# Patient Record
Sex: FEMALE | Race: WHITE | NOT HISPANIC OR LATINO | Employment: OTHER | URBAN - METROPOLITAN AREA
[De-identification: names, ages, dates, MRNs, and addresses within clinical notes are randomized per-mention and may not be internally consistent; named-entity substitution may affect disease eponyms.]

---

## 2017-01-03 DIAGNOSIS — T40.2X5A THERAPEUTIC OPIOID INDUCED CONSTIPATION: ICD-10-CM

## 2017-01-03 DIAGNOSIS — K59.03 THERAPEUTIC OPIOID INDUCED CONSTIPATION: ICD-10-CM

## 2017-01-03 RX ORDER — LUBIPROSTONE 8 UG/1
8 CAPSULE ORAL 2 TIMES DAILY WITH MEALS
Qty: 60 CAPSULE | Refills: 2 | Status: SHIPPED | OUTPATIENT
Start: 2017-01-03 | End: 2017-05-09 | Stop reason: SDUPTHER

## 2017-02-14 ENCOUNTER — OFFICE VISIT (OUTPATIENT)
Dept: GYNECOLOGIC ONCOLOGY | Facility: CLINIC | Age: 72
End: 2017-02-14
Payer: MEDICARE

## 2017-02-14 VITALS
HEART RATE: 82 BPM | HEIGHT: 60 IN | BODY MASS INDEX: 39.27 KG/M2 | WEIGHT: 200 LBS | SYSTOLIC BLOOD PRESSURE: 137 MMHG | DIASTOLIC BLOOD PRESSURE: 62 MMHG

## 2017-02-14 DIAGNOSIS — C51.9 VULVAR CANCER: Primary | ICD-10-CM

## 2017-02-14 PROCEDURE — 3078F DIAST BP <80 MM HG: CPT | Mod: S$GLB,,, | Performed by: OBSTETRICS & GYNECOLOGY

## 2017-02-14 PROCEDURE — 99214 OFFICE O/P EST MOD 30 MIN: CPT | Mod: S$GLB,,, | Performed by: OBSTETRICS & GYNECOLOGY

## 2017-02-14 PROCEDURE — 1157F ADVNC CARE PLAN IN RCRD: CPT | Mod: S$GLB,,, | Performed by: OBSTETRICS & GYNECOLOGY

## 2017-02-14 PROCEDURE — 99999 PR PBB SHADOW E&M-EST. PATIENT-LVL IV: CPT | Mod: PBBFAC,,, | Performed by: OBSTETRICS & GYNECOLOGY

## 2017-02-14 PROCEDURE — 3075F SYST BP GE 130 - 139MM HG: CPT | Mod: S$GLB,,, | Performed by: OBSTETRICS & GYNECOLOGY

## 2017-02-14 PROCEDURE — 1159F MED LIST DOCD IN RCRD: CPT | Mod: S$GLB,,, | Performed by: OBSTETRICS & GYNECOLOGY

## 2017-02-14 PROCEDURE — 1160F RVW MEDS BY RX/DR IN RCRD: CPT | Mod: S$GLB,,, | Performed by: OBSTETRICS & GYNECOLOGY

## 2017-02-14 PROCEDURE — 1125F AMNT PAIN NOTED PAIN PRSNT: CPT | Mod: S$GLB,,, | Performed by: OBSTETRICS & GYNECOLOGY

## 2017-02-14 RX ORDER — OXYCODONE HYDROCHLORIDE 5 MG/1
5 CAPSULE ORAL EVERY 6 HOURS PRN
Qty: 30 CAPSULE | Refills: 0 | Status: SHIPPED | OUTPATIENT
Start: 2017-02-14 | End: 2017-05-09 | Stop reason: SDUPTHER

## 2017-02-14 RX ORDER — HYDROCODONE BITARTRATE AND ACETAMINOPHEN 10; 325 MG/1; MG/1
1 TABLET ORAL EVERY 4 HOURS PRN
Qty: 90 TABLET | Refills: 0 | Status: SHIPPED | OUTPATIENT
Start: 2017-02-14 | End: 2017-04-07 | Stop reason: SDUPTHER

## 2017-02-14 NOTE — PROGRESS NOTES
Subjective:       Patient ID: La Summers is a 71 y.o. female.    Chief Complaint: Vulvar Cancer (f/u)    HPI     Patient comes in today for follow up for recurrent vulvar cancer. She has not wanted to start chemotherapy as she knows this would not be curative.      She is taking only 1 NORCO 10/325 in the morning, sometimes BID. Taking motrin 800 mg daily, sometimes BID. She is taking alimitiza to prevent constipation.                 Her oncologic history is:    She has a history of vulvar cancer. This was diagnosed and treated in [place], [place] at the [facility]. She does not know the stage of her disease. She had a radical vulvectomy and bilat. Inguinal femoral lymphadenectomy in 2010.    Feb 2015 recurrence and had left radical excision of recurrent SCCA of the vulva on 2/19/2015. She has undergone a left radical vulvectomy with myocutaneous gracilis muscle flap for reconstruction.    Negative margins.    Sept 2015 had WLE of 2 vulvar lesions both which showed invasive SCCA.    completed radiation to the pelvis, bilateral inguinal region, and vulvar area on December 30, 2015. She received 3600 cGy +2700 cGy boost.       Review of Systems   Respiratory: Positive for shortness of breath (with walking. ).    Psychiatric/Behavioral: The patient is nervous/anxious.         Mild depression.        Objective:       Visit Vitals    /62    Pulse 82    Ht 5' (1.524 m)    Wt 90.7 kg (200 lb)    BMI 39.06 kg/m2       Physical Exam   Constitutional: She appears well-developed and well-nourished.   HENT:   Head: Normocephalic and atraumatic.   Eyes: No scleral icterus.   Cardiovascular: Normal rate and regular rhythm.    Pulmonary/Chest: Effort normal and breath sounds normal.   Lymphadenopathy:        Right: No inguinal adenopathy present.        Left: No inguinal adenopathy present.         Physical Exam   Genitourinary:        Assessment:       1. Vulvar cancer        Plan:   Vulvar cancer     ETHAN    RTC  in 3 months.   We discussed lowering her dose of ibuprofen to 600 mg BID. She has been taking her 's prescription of 800 mg.   I discussed with her and her daughter my concerns for renal compromise from a higher dose.   Will add OXY IR for more immediate relief of pain. Continue with NORCO bid.   Trying to minimize the amount of drowsiness with narcotics.   -     oxycodone (OXY-IR) 5 mg Cap; Take 1 capsule (5 mg total) by mouth every 6 (six) hours as needed for Pain.  Dispense: 30 capsule; Refill: 0  -     hydrocodone-acetaminophen 10-325mg (NORCO)  mg Tab; Take 1 tablet by mouth every 4 (four) hours as needed.  Dispense: 90 tablet; Refill: 0

## 2017-02-14 NOTE — MR AVS SNAPSHOT
[facility] - GYN Oncology  [address]  [facility] [address]  Phone: [phone]                  La Summers   2017 3:15 PM   Office Visit    Description:  Female : 1945   Provider:  Dougie Sanchez MD   Department:  [facility] - GYN Oncology           Reason for Visit     Vulvar Cancer           Diagnoses this Visit        Comments    Vulvar cancer    -  Primary            To Do List           Goals (5 Years of Data)     None      Follow-Up and Disposition     Return in about 3 months (around 2017).       These Medications        Disp Refills Start End    oxycodone (OXY-IR) 5 mg Cap 30 capsule 0 2017     Take 1 capsule (5 mg total) by mouth every 6 (six) hours as needed for Pain. - Oral    Pharmacy: [facility] - [facility] [address] [facility] Ph #: [phone]       hydrocodone-acetaminophen 10-325mg (NORCO)  mg Tab 90 tablet 0 2017     Take 1 tablet by mouth every 4 (four) hours as needed. - Oral    Pharmacy: [facility] - [facility] [address] Ph #: [phone]         [facility] On [facility]     [facility] On Call Nurse Care Line -  Assistance  Registered nurses in the [facility] provide clinical advisement, health education, appointment booking, and other advisory services.  Call for this free service at [phone].             Medications           Message regarding Medications     Verify the changes and/or additions to your medication regime listed below are the same as discussed with your clinician today.  If any of these changes or additions are incorrect, please notify your healthcare provider.        START taking these NEW medications        Refills    oxycodone (OXY-IR) 5 mg Cap 0    Sig: Take 1 capsule (5 mg total) by mouth every 6 (six) hours as needed for Pain.    Class: Normal    Route: Oral           Verify that the below list of medications is an accurate representation  of the medications you are currently taking.  If none reported, the list may be blank. If incorrect, please contact your healthcare provider. Carry this list with you in case of emergency.           Current Medications     aspirin (ECOTRIN) 81 MG EC tablet Take 81 mg by mouth once daily.    docusate sodium (COLACE) 100 MG capsule Take 1 capsule (100 mg total) by mouth 2 (two) times daily.    fish oil-omega-3 fatty acids 300-1,000 mg capsule Take by mouth once daily.      garlic 1 mg Cap Take by mouth once daily.      hydrocodone-acetaminophen 10-325mg (NORCO)  mg Tab Take 1 tablet by mouth every 4 (four) hours as needed.    ibuprofen (ADVIL,MOTRIN) 600 MG tablet Take 1 tablet (600 mg total) by mouth every 6 (six) hours as needed for Pain.    levothyroxine (SYNTHROID) 50 MCG tablet Take by mouth before breakfast.     lidocaine HCL 2% (XYLOCAINE) 2 % jelly Apply to affected area daily prn    lovastatin (MEVACOR) 20 MG tablet Take 20 mg by mouth every evening.    lubiprostone (AMITIZA) 8 MCG Cap Take 1 capsule (8 mcg total) by mouth 2 (two) times daily with meals.    meclizine (ANTIVERT) 32 MG tablet Take 32 mg by mouth as needed.    multivitamin capsule Take 1 capsule by mouth once daily.    nystatin (MYCOSTATIN) cream Apply topically 2 (two) times daily.    nystatin (MYCOSTATIN) powder Apply 1 application topically daily as needed.    polyethylene glycol (GLYCOLAX) 17 gram/dose powder     silver sulfADIAZINE 1% (SILVADENE) 1 % cream Apply topically 2 (two) times daily.    triamcinolone acetonide 0.1% (KENALOG) 0.1 % cream     triamterene-hydrochlorothiazide 37.5-25 mg (DYAZIDE) 37.5-25 mg per capsule Take 1 capsule by mouth every morning.    trimethoprim (TRIMPEX) 100 mg Tab Take 100 mg by mouth every 12 (twelve) hours.    oxycodone (OXY-IR) 5 mg Cap Take 1 capsule (5 mg total) by mouth every 6 (six) hours as needed for Pain.           Clinical Reference Information           Your Vitals Were     BP Pulse  Height Weight BMI    137/62 82 5' (1.524 m) 90.7 kg (200 lb) 39.06 kg/m2      Blood Pressure          Most Recent Value    BP  137/62      Allergies as of 2/14/2017     Latex, Natural Rubber      Immunizations Administered on Date of Encounter - 2/14/2017     None      Language Assistance Services     ATTENTION: Language assistance services are available, free of charge. Please call [phone].      ATENCIÓN: Si habla español, tiene a white disposición servicios gratuitos de asistencia lingüística. Llame al [phone].     CHÚ Ý: N?u b?n nói Ti?ng Vi?t, có các d?ch v? h? tr? ngôn ng? mi?n phí dành cho b?n. G?i s? [phone].         [facility] On[facility] complies with applicable Federal civil rights laws and does not discriminate on the basis of race, color, national origin, age, disability, or sex.

## 2017-04-07 ENCOUNTER — TELEPHONE (OUTPATIENT)
Dept: GYNECOLOGIC ONCOLOGY | Facility: CLINIC | Age: 72
End: 2017-04-07

## 2017-04-07 DIAGNOSIS — C51.9 VULVAR CANCER: ICD-10-CM

## 2017-04-07 RX ORDER — HYDROCODONE BITARTRATE AND ACETAMINOPHEN 10; 325 MG/1; MG/1
1 TABLET ORAL EVERY 4 HOURS PRN
Qty: 90 TABLET | Refills: 0 | Status: SHIPPED | OUTPATIENT
Start: 2017-04-07 | End: 2017-05-18 | Stop reason: SDUPTHER

## 2017-04-07 NOTE — TELEPHONE ENCOUNTER
----- Message from Blake Gabriel sent at 4/7/2017 12:23 PM CDT -----  Contact: self  Pt is calling to get a refill for (Percocet) medication, pt will be using a mail order pharmacy.  Contact number [phone]

## 2017-05-09 ENCOUNTER — OFFICE VISIT (OUTPATIENT)
Dept: GYNECOLOGIC ONCOLOGY | Facility: CLINIC | Age: 72
End: 2017-05-09
Payer: MEDICARE

## 2017-05-09 VITALS
BODY MASS INDEX: 45.21 KG/M2 | DIASTOLIC BLOOD PRESSURE: 57 MMHG | HEART RATE: 85 BPM | SYSTOLIC BLOOD PRESSURE: 120 MMHG | WEIGHT: 231.5 LBS

## 2017-05-09 DIAGNOSIS — K59.03 THERAPEUTIC OPIOID INDUCED CONSTIPATION: ICD-10-CM

## 2017-05-09 DIAGNOSIS — C51.9 VULVAR CANCER: Primary | ICD-10-CM

## 2017-05-09 DIAGNOSIS — T40.2X5A THERAPEUTIC OPIOID INDUCED CONSTIPATION: ICD-10-CM

## 2017-05-09 PROCEDURE — 1125F AMNT PAIN NOTED PAIN PRSNT: CPT | Mod: S$GLB,,, | Performed by: OBSTETRICS & GYNECOLOGY

## 2017-05-09 PROCEDURE — 3074F SYST BP LT 130 MM HG: CPT | Mod: S$GLB,,, | Performed by: OBSTETRICS & GYNECOLOGY

## 2017-05-09 PROCEDURE — 1159F MED LIST DOCD IN RCRD: CPT | Mod: S$GLB,,, | Performed by: OBSTETRICS & GYNECOLOGY

## 2017-05-09 PROCEDURE — 1157F ADVNC CARE PLAN IN RCRD: CPT | Mod: 8P,S$GLB,, | Performed by: OBSTETRICS & GYNECOLOGY

## 2017-05-09 PROCEDURE — 3078F DIAST BP <80 MM HG: CPT | Mod: S$GLB,,, | Performed by: OBSTETRICS & GYNECOLOGY

## 2017-05-09 PROCEDURE — 1160F RVW MEDS BY RX/DR IN RCRD: CPT | Mod: S$GLB,,, | Performed by: OBSTETRICS & GYNECOLOGY

## 2017-05-09 PROCEDURE — 99214 OFFICE O/P EST MOD 30 MIN: CPT | Mod: S$GLB,,, | Performed by: OBSTETRICS & GYNECOLOGY

## 2017-05-09 PROCEDURE — 99999 PR PBB SHADOW E&M-EST. PATIENT-LVL III: CPT | Mod: PBBFAC,,, | Performed by: OBSTETRICS & GYNECOLOGY

## 2017-05-09 RX ORDER — IBUPROFEN 800 MG/1
800 TABLET ORAL EVERY 8 HOURS PRN
COMMUNITY

## 2017-05-09 RX ORDER — LOVASTATIN 40 MG/1
TABLET ORAL
Status: ON HOLD | COMMUNITY
Start: 2017-03-13 | End: 2017-06-30 | Stop reason: HOSPADM

## 2017-05-09 RX ORDER — LUBIPROSTONE 8 UG/1
8 CAPSULE ORAL 2 TIMES DAILY WITH MEALS
Qty: 60 CAPSULE | Refills: 2 | Status: SHIPPED | OUTPATIENT
Start: 2017-05-09

## 2017-05-09 RX ORDER — MORPHINE SULFATE ORAL SOLUTION 10 MG/5ML
10 SOLUTION ORAL EVERY 4 HOURS PRN
Qty: 200 ML | Refills: 0 | Status: SHIPPED | OUTPATIENT
Start: 2017-05-09 | End: 2017-05-12 | Stop reason: SDUPTHER

## 2017-05-09 NOTE — MR AVS SNAPSHOT
[facility] - GYN Oncology  [address]  [facility] [address]  Phone: [phone]                  La Summers   2017 3:00 PM   Office Visit    Description:  Female : 1945   Provider:  Dougie Sanchez MD   Department:  [facility] - GYN Oncology           Reason for Visit     Vulvar Cancer           Diagnoses this Visit        Comments    Vulvar cancer    -  Primary     Therapeutic opioid induced constipation                To Do List           Goals (5 Years of Data)     None      Follow-Up and Disposition     Return in about 3 months (around 2017).       These Medications        Disp Refills Start End    lubiprostone (AMITIZA) 8 MCG Cap 60 capsule 2 2017     Take 1 capsule (8 mcg total) by mouth 2 (two) times daily with meals. - Oral    Pharmacy: [facility] - [facility] [address] [facility] Ph #: [phone]       morphine 10 mg/5 mL solution 200 mL 0 2017     Take 5 mLs (10 mg total) by mouth every 4 (four) hours as needed for Pain. - Oral    [facility]: [facility] [address] [place] [address] [address] [facility] Ph #: [phone]         [facility] On Call     [facility] On Call Nurse Care Line -  Assistance  Unless otherwise directed by your provider, please contact [facility] On-Call, our nurse care line that is available for  assistance.     Registered nurses in the [facility] provide: appointment scheduling, clinical advisement, health education, and other advisory services.  Call: [phone] (toll free)               Medications           Message regarding Medications     Verify the changes and/or additions to your medication regime listed below are the same as discussed with your clinician today.  If any of these changes or additions are incorrect, please notify your healthcare provider.        START taking these NEW medications        Refills    morphine 10 mg/5 mL solution 0     Sig: Take 5 mLs (10 mg total) by mouth every 4 (four) hours as needed for Pain.    Class: Normal    Route: Oral      STOP taking these medications     oxycodone (OXY-IR) 5 mg Cap Take 1 capsule (5 mg total) by mouth every 6 (six) hours as needed for Pain.           Verify that the below list of medications is an accurate representation of the medications you are currently taking.  If none reported, the list may be blank. If incorrect, please contact your healthcare provider. Carry this list with you in case of emergency.           Current Medications     aspirin (ECOTRIN) 81 MG EC tablet Take 81 mg by mouth once daily.    docusate sodium (COLACE) 100 MG capsule Take 1 capsule (100 mg total) by mouth 2 (two) times daily.    fish oil-omega-3 fatty acids 300-1,000 mg capsule Take by mouth once daily.      garlic 1 mg Cap Take by mouth once daily.      ibuprofen (ADVIL,MOTRIN) 800 MG tablet Take 800 mg by mouth every 8 (eight) hours as needed for Pain.    levothyroxine (SYNTHROID) 50 MCG tablet Take by mouth before breakfast.     lidocaine HCL 2% (XYLOCAINE) 2 % jelly Apply to affected area daily prn    lovastatin (MEVACOR) 20 MG tablet Take 20 mg by mouth every evening.    lubiprostone (AMITIZA) 8 MCG Cap Take 1 capsule (8 mcg total) by mouth 2 (two) times daily with meals.    morphine 10 mg/5 mL solution Take 5 mLs (10 mg total) by mouth every 4 (four) hours as needed for Pain.    multivitamin capsule Take 1 capsule by mouth once daily.    nystatin (MYCOSTATIN) cream Apply topically 2 (two) times daily.    nystatin (MYCOSTATIN) powder Apply 1 application topically daily as needed.    polyethylene glycol (GLYCOLAX) 17 gram/dose powder     silver sulfADIAZINE 1% (SILVADENE) 1 % cream Apply topically 2 (two) times daily.    triamcinolone acetonide 0.1% (KENALOG) 0.1 % cream     triamterene-hydrochlorothiazide 37.5-25 mg (DYAZIDE) 37.5-25 mg per capsule Take 1 capsule by mouth every morning.    trimethoprim (TRIMPEX) 100  mg Tab Take 100 mg by mouth every 12 (twelve) hours.    hydrocodone-acetaminophen 10-325mg (NORCO)  mg Tab Take 1 tablet by mouth every 4 (four) hours as needed.    ibuprofen (ADVIL,MOTRIN) 600 MG tablet Take 1 tablet (600 mg total) by mouth every 6 (six) hours as needed for Pain.    lovastatin (MEVACOR) 40 MG tablet     meclizine (ANTIVERT) 32 MG tablet Take 32 mg by mouth as needed.           Clinical Reference Information           Your Vitals Were     BP Pulse Weight BMI       120/57 85 105 kg (231 lb 7.7 oz) 45.21 kg/m2       Blood Pressure          Most Recent Value    BP  (!)  120/57      Allergies as of 5/9/2017     Latex, Natural Rubber      Immunizations Administered on Date of Encounter - 5/9/2017     None      Language Assistance Services     ATTENTION: Language assistance services are available, free of charge. Please call [phone].      ATENCIÓN: Si habla hernandez, tiene a white disposición servicios gratuitos de asistencia lingüística. Llame al [phone].     KEYUR Ý: N?u b?n nói Ti?ng Vi?t, có các d?ch v? h? tr? ngôn ng? mi?n phí dành cho b?n. G?i s? [phone].         [facility] On[facility] complies with applicable Federal civil rights laws and does not discriminate on the basis of race, color, national origin, age, disability, or sex.

## 2017-05-09 NOTE — PROGRESS NOTES
Subjective:       Patient ID: La Summers is a 72 y.o. female.    Chief Complaint: Vulvar Cancer (3mth)    HPI  Review of Systems   Constitutional: Negative for chills, fatigue and fever.   Respiratory: Negative for cough, shortness of breath and wheezing.         Cpap     Cardiovascular: Negative for chest pain, palpitations and leg swelling.   Gastrointestinal: Negative for abdominal pain, constipation, diarrhea, nausea and vomiting.   Genitourinary: Negative for difficulty urinating, dysuria, frequency, genital sores, hematuria, urgency, vaginal bleeding, vaginal discharge and vaginal pain.        Mild vulva bleeding.   Occurs after BM.        Neurological: Negative for weakness.   Hematological: Negative for adenopathy. Does not bruise/bleed easily.   Psychiatric/Behavioral: The patient is not nervous/anxious.        Objective:     BP (!) 120/57  Pulse 85  Wt 105 kg (231 lb 7.7 oz)  BMI 45.21 kg/m2    Physical Exam   Constitutional: She is oriented to person, place, and time. She appears well-developed and well-nourished.   HENT:   Head: Normocephalic and atraumatic.   Eyes: No scleral icterus.   Abdominal: Soft. She exhibits no distension and no mass. There is no tenderness. There is no rebound and no guarding. No hernia.   Genitourinary:         Genitourinary Comments: Vulva: coalescing of all lesions on the vulva. Increase in friable areas. Unable to insert finger into the vagina.        Neurological: She is alert and oriented to person, place, and time.   Skin: Skin is warm and dry.   Psychiatric: She has a normal mood and affect. Her behavior is normal. Judgment and thought content normal.       Assessment:       1. Vulvar cancer    2. Therapeutic opioid induced constipation        Plan:   Vulvar cancer  Clinically patient has progressive disease.   We discussed pain management.   I also discussed with her and her daughter that we should begin to think about role for palliative care especially in  regards to pain management.   She did not fill oxyIR. Will resend as IR morphine for insurance.     RTC in 3 months. Sooner if needed.    -     morphine 10 mg/5 mL solution; Take 5 mLs (10 mg total) by mouth every 4 (four) hours as needed for Pain.  Dispense: 200 mL; Refill: 0    Therapeutic opioid induced constipation  -     lubiprostone (AMITIZA) 8 MCG Cap; Take 1 capsule (8 mcg total) by mouth 2 (two) times daily with meals.  Dispense: 60 capsule; Refill: 2

## 2017-05-12 ENCOUNTER — TELEPHONE (OUTPATIENT)
Dept: GYNECOLOGIC ONCOLOGY | Facility: CLINIC | Age: 72
End: 2017-05-12

## 2017-05-12 DIAGNOSIS — C51.9 VULVAR CANCER: ICD-10-CM

## 2017-05-12 RX ORDER — MORPHINE SULFATE ORAL SOLUTION 10 MG/5ML
10 SOLUTION ORAL EVERY 4 HOURS PRN
Qty: 200 ML | Refills: 0 | Status: ON HOLD | OUTPATIENT
Start: 2017-05-12 | End: 2017-07-01 | Stop reason: HOSPADM

## 2017-05-12 NOTE — TELEPHONE ENCOUNTER
Spoke with pharmacist. He states he was informed by Parul who has left for the day a new script for morphine 20 ml will sent to pharmacy. He states script has not been received. Informed pharmacist will speak with physician, contact pharmacy when script has been sent.

## 2017-05-12 NOTE — TELEPHONE ENCOUNTER
Spoke with pt. Pt states a new script for morphine 20 ml needs to be sent to [facility] pharmacy. Medication does not come in original dosage. Pt advised I will speak with pharmacy and Dr. Sanchez. Pt voiced understanding

## 2017-05-12 NOTE — TELEPHONE ENCOUNTER
----- Message from Blake Gabriel sent at 5/12/2017  1:59 PM CDT -----  Contact: Parul with [facility]   Parul with [facility] is requesting another script for pt (Morphine), also pharmacy states this medication (strength) is not available only 20 mg /ml in stock, if so a new prescription would need to be faxed to number listed.  Contact number [phone]  Fax number [phone]

## 2017-05-12 NOTE — TELEPHONE ENCOUNTER
----- Message from Blake Gabriel sent at 5/12/2017  1:59 PM CDT -----  Contact: aPrul with [facility]   Parul with [facility] is requesting another script for pt (Morphine), also pharmacy states this medication (strength) is not available only 20 mg /ml in stock, if so a new prescription would need to be faxed to number listed.  Contact number [phone]  Fax number [phone]

## 2017-05-12 NOTE — PROGRESS NOTES
Patient's daughter informed that I sent morphine rx to [facility] pharmacy as her local [facility] did not have the 10mg/5 ml.

## 2017-05-18 DIAGNOSIS — C51.9 VULVAR CANCER: ICD-10-CM

## 2017-05-18 RX ORDER — HYDROCODONE BITARTRATE AND ACETAMINOPHEN 10; 325 MG/1; MG/1
1 TABLET ORAL EVERY 4 HOURS PRN
Qty: 90 TABLET | Refills: 0 | Status: SHIPPED | OUTPATIENT
Start: 2017-05-18 | End: 2017-06-20 | Stop reason: SDUPTHER

## 2017-06-20 DIAGNOSIS — C51.9 VULVAR CANCER: ICD-10-CM

## 2017-06-21 ENCOUNTER — TELEPHONE (OUTPATIENT)
Dept: GYNECOLOGIC ONCOLOGY | Facility: CLINIC | Age: 72
End: 2017-06-21

## 2017-06-21 RX ORDER — HYDROCODONE BITARTRATE AND ACETAMINOPHEN 10; 325 MG/1; MG/1
1 TABLET ORAL EVERY 4 HOURS PRN
Qty: 90 TABLET | Refills: 0 | Status: SHIPPED | OUTPATIENT
Start: 2017-06-21

## 2017-06-23 ENCOUNTER — TELEPHONE (OUTPATIENT)
Dept: GYNECOLOGIC ONCOLOGY | Facility: HOSPITAL | Age: 72
End: 2017-06-23

## 2017-06-23 ENCOUNTER — TELEPHONE (OUTPATIENT)
Dept: GYNECOLOGIC ONCOLOGY | Facility: CLINIC | Age: 72
End: 2017-06-23

## 2017-06-23 DIAGNOSIS — N30.00 ACUTE CYSTITIS WITHOUT HEMATURIA: ICD-10-CM

## 2017-06-23 RX ORDER — CIPROFLOXACIN 500 MG/1
500 TABLET ORAL 2 TIMES DAILY
Qty: 20 TABLET | Refills: 0 | Status: ON HOLD | OUTPATIENT
Start: 2017-06-23 | End: 2017-06-30 | Stop reason: HOSPADM

## 2017-06-23 NOTE — TELEPHONE ENCOUNTER
Spoke with pt daughter Renée. She states pt had a fever of 101.7 last night. Pt took percocet with tylenol and motrin alternating medications by 3 hours per renée, with ice packs to help break fever, pt urine is dark, cloudy and orange. Renée took pt temperature this morning pt is 97.9. Renée advised I will speak with Dr. Sanchez. She voiced understanding and states if pt needs to come in be seen she will but pt does not want to because traveling is painful.

## 2017-06-23 NOTE — TELEPHONE ENCOUNTER
Returned patient call.  Mrs. Summers is a patient of Dr. Sanchez's who has extensive vulvar cancer.   Pt states she has a fever of 101.7F. ROS negative other than darkened urine. She has been taking her home percocet throughout the day. Last dose was at 1630, and this temp was at 0030. I advised her to go to the emergency room where her fever could be properly worked up and a urine sample could be collected. Pt was very hesitant to do so as traveling is very painful for her. Pt advised that if she cannot come to the ED, she needs to call Dr. Sanchez's office first thing in the morning and potentially drop a urine sample off to be tested. She stated her understanding. About an hour later, the patient's daughter called stating that her mother's temperature was now 100.2 after taking another Percocet and that she is concerned she needs to be evaluated. I reiterated to her that the pt was advised to seek care at the ED, but that she opted to call the office in the AM. Daughter stated her understanding. I reviewed precautions and stated that if she does convince her mother to go to the ED, she can ask them to page me at any time and I would be happy to see her.    Will review this with Dr. Sanchez later this AM.

## 2017-06-23 NOTE — TELEPHONE ENCOUNTER
----- Message from Isabell Dao sent at 6/23/2017  8:15 AM CDT -----  La Summers has fever since last night/please contact her daughter (Alexandria) at [phone]     Pt was advised by on-call physician last night to go to ER & she did not want to go per Alexandria     Clinic# [id number]

## 2017-06-23 NOTE — TELEPHONE ENCOUNTER
Spoke with renée. She is aware per Dr. Sanchez, pt urine will be treated for a UTI. Dr. Sanchez will send medication to her pharmacy. If fever persists she will need to go to the emergency room. Renée voiced understanding

## 2017-06-24 ENCOUNTER — HOSPITAL ENCOUNTER (INPATIENT)
Facility: HOSPITAL | Age: 72
LOS: 6 days | Discharge: HOSPICE | DRG: 871 | End: 2017-07-01
Attending: EMERGENCY MEDICINE | Admitting: HOSPITALIST
Payer: MEDICARE

## 2017-06-24 DIAGNOSIS — C51.9 VULVAR CANCER: Primary | ICD-10-CM

## 2017-06-24 DIAGNOSIS — R50.9 FEVER, UNSPECIFIED FEVER CAUSE: ICD-10-CM

## 2017-06-24 DIAGNOSIS — G47.33 OBSTRUCTIVE SLEEP APNEA ON CPAP: Chronic | ICD-10-CM

## 2017-06-24 DIAGNOSIS — R07.9 CHEST PAIN: ICD-10-CM

## 2017-06-24 DIAGNOSIS — R01.1 CARDIAC MURMUR: ICD-10-CM

## 2017-06-24 DIAGNOSIS — A41.9 SEPSIS SECONDARY TO UTI: ICD-10-CM

## 2017-06-24 DIAGNOSIS — N39.0 SEPSIS SECONDARY TO UTI: ICD-10-CM

## 2017-06-24 DIAGNOSIS — N39.0 URINARY TRACT INFECTION WITHOUT HEMATURIA, SITE UNSPECIFIED: ICD-10-CM

## 2017-06-24 DIAGNOSIS — J45.901 REACTIVE AIRWAY DISEASE, UNSPECIFIED ASTHMA SEVERITY, WITH ACUTE EXACERBATION: ICD-10-CM

## 2017-06-24 DIAGNOSIS — R06.02 SHORTNESS OF BREATH: ICD-10-CM

## 2017-06-24 LAB
ALBUMIN SERPL BCP-MCNC: 2 G/DL
ALP SERPL-CCNC: 137 U/L
ALT SERPL W/O P-5'-P-CCNC: 14 U/L
ANION GAP SERPL CALC-SCNC: 13 MMOL/L
ANISOCYTOSIS BLD QL SMEAR: SLIGHT
AST SERPL-CCNC: 20 U/L
BASOPHILS # BLD AUTO: 0.01 K/UL
BASOPHILS NFR BLD: 0 %
BILIRUB SERPL-MCNC: 0.8 MG/DL
BNP SERPL-MCNC: 298 PG/ML
BUN SERPL-MCNC: 19 MG/DL
CALCIUM SERPL-MCNC: 8.7 MG/DL
CHLORIDE SERPL-SCNC: 95 MMOL/L
CO2 SERPL-SCNC: 25 MMOL/L
CREAT SERPL-MCNC: 1.2 MG/DL
DIFFERENTIAL METHOD: ABNORMAL
EOSINOPHIL # BLD AUTO: 0.1 K/UL
EOSINOPHIL NFR BLD: 0.4 %
ERYTHROCYTE [DISTWIDTH] IN BLOOD BY AUTOMATED COUNT: 14.9 %
EST. GFR  (AFRICAN AMERICAN): 53 ML/MIN/1.73 M^2
EST. GFR  (NON AFRICAN AMERICAN): 46 ML/MIN/1.73 M^2
GLUCOSE SERPL-MCNC: 148 MG/DL
HCT VFR BLD AUTO: 26.8 %
HGB BLD-MCNC: 8.4 G/DL
INR PPP: 1.3
LYMPHOCYTES # BLD AUTO: 0.4 K/UL
LYMPHOCYTES NFR BLD: 1.9 %
MAGNESIUM SERPL-MCNC: 1 MG/DL
MCH RBC QN AUTO: 28.2 PG
MCHC RBC AUTO-ENTMCNC: 31.3 %
MCV RBC AUTO: 90 FL
MONOCYTES # BLD AUTO: 1.4 K/UL
MONOCYTES NFR BLD: 6 %
NEUTROPHILS # BLD AUTO: 20.9 K/UL
NEUTROPHILS NFR BLD: 91.7 %
PLATELET # BLD AUTO: 241 K/UL
PLATELET BLD QL SMEAR: ABNORMAL
PMV BLD AUTO: 9.2 FL
POTASSIUM SERPL-SCNC: 3.1 MMOL/L
PROT SERPL-MCNC: 7.7 G/DL
PROTHROMBIN TIME: 13.3 SEC
RBC # BLD AUTO: 2.98 M/UL
SODIUM SERPL-SCNC: 133 MMOL/L
WBC # BLD AUTO: 22.84 K/UL

## 2017-06-24 PROCEDURE — 99285 EMERGENCY DEPT VISIT HI MDM: CPT | Mod: 25

## 2017-06-24 PROCEDURE — 93005 ELECTROCARDIOGRAM TRACING: CPT

## 2017-06-24 PROCEDURE — 83735 ASSAY OF MAGNESIUM: CPT

## 2017-06-24 PROCEDURE — 96361 HYDRATE IV INFUSION ADD-ON: CPT

## 2017-06-24 PROCEDURE — 84484 ASSAY OF TROPONIN QUANT: CPT

## 2017-06-24 PROCEDURE — 85610 PROTHROMBIN TIME: CPT

## 2017-06-24 PROCEDURE — 85025 COMPLETE CBC W/AUTO DIFF WBC: CPT

## 2017-06-24 PROCEDURE — 96365 THER/PROPH/DIAG IV INF INIT: CPT

## 2017-06-24 PROCEDURE — 83880 ASSAY OF NATRIURETIC PEPTIDE: CPT

## 2017-06-24 PROCEDURE — 96375 TX/PRO/DX INJ NEW DRUG ADDON: CPT

## 2017-06-24 PROCEDURE — 80053 COMPREHEN METABOLIC PANEL: CPT

## 2017-06-24 PROCEDURE — 93010 ELECTROCARDIOGRAM REPORT: CPT | Mod: S$GLB,,, | Performed by: INTERNAL MEDICINE

## 2017-06-24 RX ADMIN — IOHEXOL 100 ML: 350 INJECTION, SOLUTION INTRAVENOUS at 11:06

## 2017-06-25 PROBLEM — A41.9 SEPSIS SECONDARY TO UTI: Status: ACTIVE | Noted: 2017-06-25

## 2017-06-25 PROBLEM — G89.3 CHRONIC NEOPLASM-RELATED PAIN: Chronic | Status: ACTIVE | Noted: 2017-06-25

## 2017-06-25 PROBLEM — N17.9 ACUTE KIDNEY INJURY: Status: ACTIVE | Noted: 2017-06-25

## 2017-06-25 PROBLEM — R01.1 HOLOSYSTOLIC MURMUR: Chronic | Status: ACTIVE | Noted: 2017-06-25

## 2017-06-25 PROBLEM — E87.6 HYPOKALEMIA: Status: ACTIVE | Noted: 2017-06-25

## 2017-06-25 PROBLEM — N39.0 SEPSIS SECONDARY TO UTI: Status: ACTIVE | Noted: 2017-06-25

## 2017-06-25 PROBLEM — E83.42 HYPOMAGNESEMIA: Status: ACTIVE | Noted: 2017-06-25

## 2017-06-25 LAB
BACTERIA #/AREA URNS HPF: ABNORMAL /HPF
BILIRUB UR QL STRIP: NEGATIVE
CLARITY UR: CLEAR
COLOR UR: YELLOW
GLUCOSE UR QL STRIP: NEGATIVE
GRAM STN SPEC: NORMAL
GRAM STN SPEC: NORMAL
HGB UR QL STRIP: ABNORMAL
KETONES UR QL STRIP: NEGATIVE
LEUKOCYTE ESTERASE UR QL STRIP: ABNORMAL
MICROSCOPIC COMMENT: ABNORMAL
NITRITE UR QL STRIP: NEGATIVE
PH UR STRIP: 6 [PH] (ref 5–8)
PROT UR QL STRIP: ABNORMAL
RBC #/AREA URNS HPF: 12 /HPF (ref 0–4)
SP GR UR STRIP: 1.01 (ref 1–1.03)
SQUAMOUS #/AREA URNS HPF: 15 /HPF
TROPONIN I SERPL DL<=0.01 NG/ML-MCNC: 0.02 NG/ML
URN SPEC COLLECT METH UR: ABNORMAL
UROBILINOGEN UR STRIP-ACNC: 1 EU/DL
WBC #/AREA URNS HPF: >100 /HPF (ref 0–5)
WBC CLUMPS URNS QL MICRO: ABNORMAL

## 2017-06-25 PROCEDURE — 27000190 HC CPAP FULL FACE MASK W/VALVE

## 2017-06-25 PROCEDURE — 25000003 PHARM REV CODE 250: Performed by: EMERGENCY MEDICINE

## 2017-06-25 PROCEDURE — 94761 N-INVAS EAR/PLS OXIMETRY MLT: CPT

## 2017-06-25 PROCEDURE — 11000001 HC ACUTE MED/SURG PRIVATE ROOM

## 2017-06-25 PROCEDURE — 25000003 PHARM REV CODE 250: Performed by: HOSPITALIST

## 2017-06-25 PROCEDURE — 63600175 PHARM REV CODE 636 W HCPCS: Performed by: HOSPITALIST

## 2017-06-25 PROCEDURE — 25500020 PHARM REV CODE 255: Performed by: EMERGENCY MEDICINE

## 2017-06-25 PROCEDURE — 87205 SMEAR GRAM STAIN: CPT

## 2017-06-25 PROCEDURE — 99900035 HC TECH TIME PER 15 MIN (STAT)

## 2017-06-25 PROCEDURE — 63600175 PHARM REV CODE 636 W HCPCS: Performed by: EMERGENCY MEDICINE

## 2017-06-25 PROCEDURE — 81000 URINALYSIS NONAUTO W/SCOPE: CPT

## 2017-06-25 PROCEDURE — 94660 CPAP INITIATION&MGMT: CPT

## 2017-06-25 PROCEDURE — 87086 URINE CULTURE/COLONY COUNT: CPT

## 2017-06-25 RX ORDER — SODIUM CHLORIDE 9 MG/ML
1000 INJECTION, SOLUTION INTRAVENOUS
Status: COMPLETED | OUTPATIENT
Start: 2017-06-25 | End: 2017-06-25

## 2017-06-25 RX ORDER — POLYETHYLENE GLYCOL 3350 17 G/17G
17 POWDER, FOR SOLUTION ORAL 2 TIMES DAILY
Status: DISCONTINUED | OUTPATIENT
Start: 2017-06-25 | End: 2017-07-01 | Stop reason: HOSPADM

## 2017-06-25 RX ORDER — LEVOTHYROXINE SODIUM 50 UG/1
50 TABLET ORAL
Status: DISCONTINUED | OUTPATIENT
Start: 2017-06-25 | End: 2017-07-01 | Stop reason: HOSPADM

## 2017-06-25 RX ORDER — MORPHINE SULFATE 10 MG/ML
4 INJECTION INTRAMUSCULAR; INTRAVENOUS; SUBCUTANEOUS
Status: COMPLETED | OUTPATIENT
Start: 2017-06-25 | End: 2017-06-25

## 2017-06-25 RX ORDER — ACETAMINOPHEN 325 MG/1
650 TABLET ORAL EVERY 6 HOURS PRN
Status: DISCONTINUED | OUTPATIENT
Start: 2017-06-25 | End: 2017-07-01 | Stop reason: HOSPADM

## 2017-06-25 RX ORDER — DOCUSATE SODIUM 100 MG/1
100 CAPSULE, LIQUID FILLED ORAL 2 TIMES DAILY
Status: DISCONTINUED | OUTPATIENT
Start: 2017-06-25 | End: 2017-07-01 | Stop reason: HOSPADM

## 2017-06-25 RX ORDER — ONDANSETRON 2 MG/ML
4 INJECTION INTRAMUSCULAR; INTRAVENOUS
Status: COMPLETED | OUTPATIENT
Start: 2017-06-25 | End: 2017-06-25

## 2017-06-25 RX ORDER — LUBIPROSTONE 8 UG/1
8 CAPSULE ORAL 2 TIMES DAILY WITH MEALS
Status: DISCONTINUED | OUTPATIENT
Start: 2017-06-25 | End: 2017-07-01 | Stop reason: HOSPADM

## 2017-06-25 RX ORDER — MECLIZINE HYDROCHLORIDE 25 MG/1
25 TABLET ORAL 3 TIMES DAILY PRN
Status: DISCONTINUED | OUTPATIENT
Start: 2017-06-25 | End: 2017-07-01 | Stop reason: HOSPADM

## 2017-06-25 RX ORDER — BUTALBITAL, ACETAMINOPHEN AND CAFFEINE 50; 325; 40 MG/1; MG/1; MG/1
1 TABLET ORAL EVERY 4 HOURS PRN
Status: DISCONTINUED | OUTPATIENT
Start: 2017-06-25 | End: 2017-07-01 | Stop reason: HOSPADM

## 2017-06-25 RX ORDER — HYDROCODONE BITARTRATE AND ACETAMINOPHEN 10; 325 MG/1; MG/1
1 TABLET ORAL EVERY 4 HOURS PRN
Status: DISCONTINUED | OUTPATIENT
Start: 2017-06-25 | End: 2017-06-26

## 2017-06-25 RX ORDER — POTASSIUM CHLORIDE 20 MEQ/1
40 TABLET, EXTENDED RELEASE ORAL ONCE
Status: COMPLETED | OUTPATIENT
Start: 2017-06-25 | End: 2017-06-25

## 2017-06-25 RX ORDER — MAGNESIUM SULFATE HEPTAHYDRATE 40 MG/ML
2 INJECTION, SOLUTION INTRAVENOUS EVERY 4 HOURS
Status: COMPLETED | OUTPATIENT
Start: 2017-06-25 | End: 2017-06-25

## 2017-06-25 RX ORDER — CEFEPIME HYDROCHLORIDE 1 G/50ML
1 INJECTION, SOLUTION INTRAVENOUS
Status: DISCONTINUED | OUTPATIENT
Start: 2017-06-25 | End: 2017-06-29

## 2017-06-25 RX ORDER — SODIUM CHLORIDE 9 MG/ML
INJECTION, SOLUTION INTRAVENOUS CONTINUOUS
Status: DISCONTINUED | OUTPATIENT
Start: 2017-06-25 | End: 2017-06-26

## 2017-06-25 RX ORDER — MORPHINE SULFATE ORAL SOLUTION 10 MG/5ML
10 SOLUTION ORAL EVERY 4 HOURS PRN
Status: DISCONTINUED | OUTPATIENT
Start: 2017-06-25 | End: 2017-06-26

## 2017-06-25 RX ORDER — BUTALBITAL, ACETAMINOPHEN AND CAFFEINE 50; 325; 40 MG/1; MG/1; MG/1
1 TABLET ORAL ONCE
Status: COMPLETED | OUTPATIENT
Start: 2017-06-25 | End: 2017-06-25

## 2017-06-25 RX ORDER — SODIUM CHLORIDE 9 MG/ML
INJECTION, SOLUTION INTRAVENOUS CONTINUOUS
Status: DISCONTINUED | OUTPATIENT
Start: 2017-06-25 | End: 2017-06-25

## 2017-06-25 RX ADMIN — SODIUM CHLORIDE: 0.9 INJECTION, SOLUTION INTRAVENOUS at 04:06

## 2017-06-25 RX ADMIN — HYDROCODONE BITARTRATE AND ACETAMINOPHEN 1 TABLET: 10; 325 TABLET ORAL at 09:06

## 2017-06-25 RX ADMIN — MAGNESIUM SULFATE HEPTAHYDRATE 2 G: 40 INJECTION, SOLUTION INTRAVENOUS at 11:06

## 2017-06-25 RX ADMIN — DOCUSATE SODIUM 100 MG: 100 CAPSULE, LIQUID FILLED ORAL at 08:06

## 2017-06-25 RX ADMIN — LUBIPROSTONE 8 MCG: 8 CAPSULE, GELATIN COATED ORAL at 08:06

## 2017-06-25 RX ADMIN — MAGNESIUM SULFATE HEPTAHYDRATE 2 G: 40 INJECTION, SOLUTION INTRAVENOUS at 05:06

## 2017-06-25 RX ADMIN — LEVOTHYROXINE SODIUM 50 MCG: 50 TABLET ORAL at 05:06

## 2017-06-25 RX ADMIN — SODIUM CHLORIDE 1000 ML: 0.9 INJECTION, SOLUTION INTRAVENOUS at 01:06

## 2017-06-25 RX ADMIN — SODIUM CHLORIDE: 0.9 INJECTION, SOLUTION INTRAVENOUS at 09:06

## 2017-06-25 RX ADMIN — HYDROCODONE BITARTRATE AND ACETAMINOPHEN 1 TABLET: 10; 325 TABLET ORAL at 08:06

## 2017-06-25 RX ADMIN — LUBIPROSTONE 8 MCG: 8 CAPSULE, GELATIN COATED ORAL at 04:06

## 2017-06-25 RX ADMIN — CEFEPIME HYDROCHLORIDE 1 G: 1 INJECTION, SOLUTION INTRAVENOUS at 04:06

## 2017-06-25 RX ADMIN — POLYETHYLENE GLYCOL 3350 17 G: 17 POWDER, FOR SOLUTION ORAL at 08:06

## 2017-06-25 RX ADMIN — HYDROCODONE BITARTRATE AND ACETAMINOPHEN 1 TABLET: 10; 325 TABLET ORAL at 04:06

## 2017-06-25 RX ADMIN — SODIUM CHLORIDE 500 ML: 0.9 INJECTION, SOLUTION INTRAVENOUS at 10:06

## 2017-06-25 RX ADMIN — ONDANSETRON 4 MG: 2 INJECTION INTRAMUSCULAR; INTRAVENOUS at 12:06

## 2017-06-25 RX ADMIN — VANCOMYCIN HYDROCHLORIDE 1500 MG: 1 INJECTION, POWDER, LYOPHILIZED, FOR SOLUTION INTRAVENOUS at 06:06

## 2017-06-25 RX ADMIN — CEFTRIAXONE 1 G: 1 INJECTION, SOLUTION INTRAVENOUS at 02:06

## 2017-06-25 RX ADMIN — POTASSIUM CHLORIDE 40 MEQ: 1500 TABLET, EXTENDED RELEASE ORAL at 03:06

## 2017-06-25 RX ADMIN — BUTALBITAL, ACETAMINOPHEN, AND CAFFEINE 1 TABLET: 50; 325; 40 TABLET ORAL at 11:06

## 2017-06-25 RX ADMIN — MORPHINE SULFATE 4 MG: 10 INJECTION INTRAVENOUS at 12:06

## 2017-06-25 NOTE — HPI
La Summers is a 72 y.o. 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  She lives in [place], [place] with her  and son.  Her daughter Alexandria is a nurse.  Her primary care physician is Dr. Mary Ann Mccray.  Her gynecologic oncologist is Dr. Dougie Sanchez.      Dr. Sanchez prescribed her ciprofloxacin for a UTI on 6/23/17.  She had fever of 101.7 F at the time.  She went to [facility] ED on 6/24/17 with shortness of breath that began in the afternoon, with mucous congestion.  She had poor appetite and felt too weak to stand.  Due to mechanical obstruction by her cancer, she must stand to urinate.  Dr. Sanchez had referred to her to urologist Dr. Savage Quinonez to address this worsening problem.  She had bilateral leg edema.  She was not hypoxic.  She was tachypnic (respiratory rate 22) and put on nasal cannula.  Blood pressure was 109/56.  Labs showed leukocytosis (WBC 22,840), anemia (hemoglobin 8.4, hematocrit 26.8), hypokalemia (3.1), acute kidney injury (creatinine 1.2 from 0.8 on 9/13/16), hypoalbuminemia (2.0), elevated BNP (298).  Urinalysis had >100 WBC/hpf, moderate WBC clumps, and moderate bacteria.  Chest CTA showed no pulmonary embolism, but showed nonspecific dilation of the left pulmonary artery, and mosaic pattern of the pulmonary parenchyma suggesting reactive airway disease or chronic thromboembolic disease.  She denied history of ever having asthma, COPD, or pulmonary embolism.  She was given IV  morphine, ondansetron, and normal saline, and admitted to [facility].  She was also found to have severe hypomagnesemia (1.0).

## 2017-06-25 NOTE — PROGRESS NOTES
Vancomycin Pharmacokinetics Monitoring Protocol  71 y/o female, Ht 60 in, Wt 106.4kg, adjBW 69.9kg, SCr 1.2, eCrCl ~ 48 ml/min  Indication: sepsis 2/2 UTI  Target trough: 15 -20 mcg/ml  Current antibiotics: Cefepime 1gram IV q12h, vancomycin 1250mg IV q12h  Based on her pharmacokinetics, plan to give vancomycin 1500mg IV (AdjBW = 69.9 kg) x1, then start vanc 1250mg IV q24 with trough drawn prior to the 4th dose (6/28 @ 1700). Pharmacy will continue to follow.

## 2017-06-25 NOTE — SUBJECTIVE & OBJECTIVE
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       bilateral. Sept 2015.        Review of patient's allergies indicates:   Allergen Reactions    Adhesive     Latex, natural rubber      Skin irritation       No current facility-administered medications on file prior to encounter.      Current Outpatient Prescriptions on File Prior to Encounter   Medication Sig    aspirin (ECOTRIN) 81 MG EC tablet Take 81 mg by mouth once daily.    ciprofloxacin HCl (CIPRO) 500 MG tablet Take 1 tablet (500 mg total) by mouth 2 (two) times daily.    docusate sodium (COLACE) 100 MG capsule Take 1 capsule (100 mg total) by mouth 2 (two) times daily.    fish oil-omega-3 fatty acids 300-1,000 mg capsule Take by mouth once daily.      garlic 1 mg Cap Take by mouth once daily.      hydrocodone-acetaminophen 10-325mg (NORCO)  mg Tab Take 1 tablet by mouth every 4 (four) hours as needed.    ibuprofen (ADVIL,MOTRIN) 600 MG tablet Take 1 tablet (600 mg total) by mouth every 6 (six) hours as needed for Pain.    ibuprofen (ADVIL,MOTRIN) 800 MG tablet Take 800 mg by mouth every 8 (eight) hours as needed for Pain.    levothyroxine (SYNTHROID) 50 MCG tablet Take 50 mcg by mouth before breakfast.     lidocaine HCL 2% (XYLOCAINE) 2 % jelly Apply to affected area daily prn    lovastatin (MEVACOR) 20 MG tablet Take 20 mg by mouth every evening.    lovastatin (MEVACOR) 40 MG tablet     lubiprostone (AMITIZA) 8 MCG Cap Take 1 capsule (8 mcg total) by mouth 2 (two) times  daily with meals.    meclizine (ANTIVERT) 32 MG tablet Take 32 mg by mouth as needed.    morphine 10 mg/5 mL solution Take 5 mLs (10 mg total) by mouth every 4 (four) hours as needed for Pain.    multivitamin capsule Take 1 capsule by mouth once daily.    nystatin (MYCOSTATIN) cream Apply topically 2 (two) times daily.    nystatin (MYCOSTATIN) powder Apply 1 application topically daily as needed.    polyethylene glycol (GLYCOLAX) 17 gram/dose powder     silver sulfADIAZINE 1% (SILVADENE) 1 % cream Apply topically 2 (two) times daily.    triamcinolone acetonide 0.1% (KENALOG) 0.1 % cream     triamterene-hydrochlorothiazide 37.5-25 mg (DYAZIDE) 37.5-25 mg per capsule Take 1 capsule by mouth every morning.     Family History     Problem Relation (Age of Onset)    Cancer Daughter        Social History Main Topics    Smoking status: Never Smoker    Smokeless tobacco: Not on file    Alcohol use No    Drug use: No    Sexual activity: No     Review of Systems   Constitutional: Positive for appetite change and fatigue.   HENT: Positive for congestion. Negative for trouble swallowing.    Eyes: Negative for pain and redness.   Respiratory: Positive for shortness of breath. Negative for chest tightness.    Cardiovascular: Positive for leg swelling. Negative for chest pain.   Gastrointestinal: Positive for constipation. Negative for blood in stool.   Genitourinary: Positive for difficulty urinating. Negative for hematuria.   Musculoskeletal: Negative for neck pain and neck stiffness.   Skin: Negative for rash and wound.   Neurological: Negative for syncope and speech difficulty.     Objective:     Vital Signs (Most Recent):  Temp: 97.4 °F (36.3 °C) (06/25/17 0040)  Pulse: 95 (06/25/17 0146)  Resp: 16 (06/25/17 0146)  BP: (!) 123/57 (06/25/17 0146)  SpO2: 100 % (06/25/17 0146) Vital Signs (24h Range):  Temp:  [97.4 °F (36.3 °C)] 97.4 °F (36.3 °C)  Pulse:  [] 95  Resp:  [16-22] 16  SpO2:  [95 %-100 %] 100  %  BP: (109-128)/(56-62) 123/57     Weight: 104.8 kg (231 lb)  Body mass index is 45.11 kg/m².    Physical Exam   Constitutional: She is oriented to person, place, and time. She appears well-developed. No distress.   HENT:   Head: Normocephalic and atraumatic.   Eyes: Conjunctivae are normal. Pupils are equal, round, and reactive to light.   Neck: Neck supple. No tracheal deviation present.   Cardiovascular: Normal rate and regular rhythm.    Murmur heard.   Systolic murmur is present   Pulmonary/Chest: Effort normal and breath sounds normal. No respiratory distress.   Abdominal: Soft. She exhibits no distension. There is no tenderness.   Musculoskeletal: She exhibits edema. She exhibits no deformity.   Neurological: She is alert and oriented to person, place, and time.   Skin: Skin is warm and dry.   Psychiatric: She has a normal mood and affect.   Nursing note and vitals reviewed.       Significant Labs: All pertinent labs within the past 24 hours have been reviewed.    Significant Imaging: I have reviewed all pertinent imaging results/findings within the past 24 hours.   CTA Chest Non-Coronary (PE Study) 6/25/17: CT pulmonary embolism examination:  There are no filling defects within the pulmonary artery suggesting pulmonary embolism.  There is nonspecific dilation of the left main pulmonary artery.  CT chest examination:  The thyroid gland is unremarkable.  The visualized portions of the base of the neck neck is within normal limits.  The trachea and central airways are within normal limits.  There is no evidence of bronchiectasis.  The heart is normal in appearance.  Coronary artery calcifications are present.  There are no pericardial effusions.  The thoracic aorta is normal in caliber.  Scattered calcifications of the thoracic aorta.  The great vessels arising from aortic arch are within normal limits.  There is no evidence of lymphadenopathy in the chest.  The axillary regions are within normal  limits.  There are no pleural effusions.  There is no evidence of a pneumothorax.  Evaluation for lung nodules is difficult given patient motion.  There is mosaic attenuation of the pulmonary parenchyma.  No focal consolidations are identified.  The esophagus is within normal limits.  The visualized upper abdominal structures are unremarkable.  There is diffuse osteopenia making evaluation difficult.  No definitive fracture is identified.  No blastic lesion is present.  Chest wall is unremarkable.  Impression:   1.  No evidence of pulmonary embolism.  2.  Mosaic attenuation of the pulmonary parenchyma.  The findings may be seen with reactive airway disease or chronic thromboembolic disease.  Suggest clinical correlation.  3.  Additional findings as above.

## 2017-06-25 NOTE — PLAN OF CARE
Chief Complaint   Patient presents with    Shortness of Breath       sob x 1 day, post nasal drip and congstion x 2 day, generally feeling ill with low grade temp. started on cipro a few days ago for uti but was never evaluated.     No current HH, has Quad Cane/C-PAP, prior to admit was independent, family will provide transportation home    Yanet Funez RN Transitional Navigator  [phone]

## 2017-06-25 NOTE — PLAN OF CARE
Chief Complaint   Patient presents with    Shortness of Breath       sob x 1 day, post nasal drip and congstion x 2 day, generally feeling ill with low grade temp. started on cipro a few days ago for uti but was never evaluated.     No current HH, has Quad Cane/C-PAP, prior to admit was independent, family will provide transportation home       06/25/17 1726   Discharge Assessment   Assessment Type Discharge Planning Assessment   Confirmed/corrected address and phone number on facesheet? Yes   Assessment information obtained from? Patient   Expected Length of Stay (days) 2   Communicated expected length of stay with patient/caregiver yes   Prior to hospitilization cognitive status: Alert/Oriented   Prior to hospitalization functional status: Independent   Current cognitive status: Alert/Oriented   Current Functional Status: Needs Assistance   Arrived From home or self-care   Lives With spouse   Able to Return to Prior Arrangements yes   Is patient able to care for self after discharge? Yes   How many people do you have in your home that can help with your care after discharge? 1   Who are your caregiver(s) and their phone number(s)? daughter:  Carine Duvallcomo  [phone], son:  Dariel Duvallcomo [phone]   Patient's perception of discharge disposition home or selfcare   Readmission Within The Last 30 Days no previous admission in last 30 days   Patient currently being followed by outpatient case management? No   Patient currently receives home health services? No   Does the patient currently use HME? Yes   Patient currently receives private duty nursing? No   Equipment Currently Used at Home cane, quad;CPAP   Do you have any problems affording any of your prescribed medications? No   Is the patient taking medications as prescribed? yes   Do you have any financial concerns preventing you from receiving the healthcare you need? No   Does the patient have transportation to healthcare appointments? Yes    Transportation Available family or friend will provide   On Dialysis? No   Does the patient receive services at the [facility] Clinic? No   Are there any open cases? No   Discharge Plan A Home   Discharge Plan B Home with family   Patient/Family In Agreement With Plan yes       Yanet Funez RN Transitional Navigator  [phone]

## 2017-06-25 NOTE — H&P
[facility]  History & Physical    Patient Name: La Summers  MRN: [id number]  Admission Date: 6/24/2017  Attending Physician: Jones Rocha MD   Primary Care Provider: Suzette Mccray MD         Patient information was obtained from patient, past medical records and ER records.     Subjective:     Principal Problem:Sepsis secondary to UTI    Chief Complaint:   Chief Complaint   Patient presents with    Shortness of Breath     sob x 1 day, post nasal drip and congstion x 2 day, generally feeling ill with low grade temp. started on cipro a few days ago for uti but was never evaluated.          HPI: La Summers is a 72 y.o. 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  She lives in [place], [place] with her  and son.  Her daughter Alexandria is a nurse.  Her primary care physician is Dr. Mary Ann Mccray.  Her gynecologic oncologist is Dr. Dougie Sanchez.      Dr. Sanchez prescribed her ciprofloxacin for a UTI on 6/23/17.  She had fever of 101.7 F at the time.  She went to [facility] ED on 6/24/17 with shortness of breath that began in the afternoon, with mucous congestion.  She had poor appetite and felt too weak to stand.  Due to mechanical obstruction by her cancer, she must stand to urinate.  Dr. Sanchez had referred to her to urologist Dr. Savage Quinonez to address this worsening problem.  She had bilateral leg edema.  She was not hypoxic.  She was tachypnic (respiratory rate 22) and put on nasal  cannula.  Blood pressure was 109/56.  Labs showed leukocytosis (WBC 22,840), anemia (hemoglobin 8.4, hematocrit 26.8), hypokalemia (3.1), acute kidney injury (creatinine 1.2 from 0.8 on 9/13/16), hypoalbuminemia (2.0), elevated BNP (298).  Urinalysis had >100 WBC/hpf, moderate WBC clumps, and moderate bacteria.  Chest CTA showed no pulmonary embolism, but showed nonspecific dilation of the left pulmonary artery, and mosaic pattern of the pulmonary parenchyma suggesting reactive airway disease or chronic thromboembolic disease.  She denied history of ever having pulmonary embolism.  She was given IV morphine, ondansetron, and normal saline, and admitted to [facility].  She was also found to have severe hypomagnesemia (1.0).    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       bilateral. Sept 2015.        Review of patient's allergies indicates:   Allergen Reactions    Adhesive     Latex, natural rubber      Skin irritation       No current facility-administered medications on file prior to encounter.      Current Outpatient Prescriptions on File Prior to Encounter   Medication Sig    aspirin (ECOTRIN) 81 MG EC tablet Take 81 mg by mouth once daily.    ciprofloxacin HCl (CIPRO) 500 MG tablet Take 1 tablet (500 mg total) by mouth 2 (two) times daily.    docusate sodium (COLACE) 100 MG capsule Take 1 capsule (100 mg total) by mouth 2 (two) times daily.    fish oil-omega-3 fatty acids 300-1,000 mg capsule Take by mouth once daily.       garlic 1 mg Cap Take by mouth once daily.      hydrocodone-acetaminophen 10-325mg (NORCO)  mg Tab Take 1 tablet by mouth every 4 (four) hours as needed.    ibuprofen (ADVIL,MOTRIN) 600 MG tablet Take 1 tablet (600 mg total) by mouth every 6 (six) hours as needed for Pain.    ibuprofen (ADVIL,MOTRIN) 800 MG tablet Take 800 mg by mouth every 8 (eight) hours as needed for Pain.    levothyroxine (SYNTHROID) 50 MCG tablet Take 50 mcg by mouth before breakfast.     lidocaine HCL 2% (XYLOCAINE) 2 % jelly Apply to affected area daily prn    lovastatin (MEVACOR) 20 MG tablet Take 20 mg by mouth every evening.    lovastatin (MEVACOR) 40 MG tablet     lubiprostone (AMITIZA) 8 MCG Cap Take 1 capsule (8 mcg total) by mouth 2 (two) times daily with meals.    meclizine (ANTIVERT) 32 MG tablet Take 32 mg by mouth as needed.    morphine 10 mg/5 mL solution Take 5 mLs (10 mg total) by mouth every 4 (four) hours as needed for Pain.    multivitamin capsule Take 1 capsule by mouth once daily.    nystatin (MYCOSTATIN) cream Apply topically 2 (two) times daily.    nystatin (MYCOSTATIN) powder Apply 1 application topically daily as needed.    polyethylene glycol (GLYCOLAX) 17 gram/dose powder     silver sulfADIAZINE 1% (SILVADENE) 1 % cream Apply topically 2 (two) times daily.    triamcinolone acetonide 0.1% (KENALOG) 0.1 % cream     triamterene-hydrochlorothiazide 37.5-25 mg (DYAZIDE) 37.5-25 mg per capsule Take 1 capsule by mouth every morning.     Family History     Problem Relation (Age of Onset)    Cancer Daughter        Social History Main Topics    Smoking status: Never Smoker    Smokeless tobacco: Not on file    Alcohol use No    Drug use: No    Sexual activity: No     Review of Systems   Constitutional: Positive for appetite change and fatigue.   HENT: Positive for congestion. Negative for trouble swallowing.    Eyes: Negative for pain and redness.   Respiratory: Positive for shortness of breath.  Negative for chest tightness.    Cardiovascular: Positive for leg swelling. Negative for chest pain.   Gastrointestinal: Positive for constipation. Negative for blood in stool.   Genitourinary: Positive for difficulty urinating. Negative for hematuria.   Musculoskeletal: Negative for neck pain and neck stiffness.   Skin: Negative for rash and wound.   Neurological: Negative for syncope and speech difficulty.     Objective:     Vital Signs (Most Recent):  Temp: 97.4 °F (36.3 °C) (06/25/17 0040)  Pulse: 95 (06/25/17 0146)  Resp: 16 (06/25/17 0146)  BP: (!) 123/57 (06/25/17 0146)  SpO2: 100 % (06/25/17 0146) Vital Signs (24h Range):  Temp:  [97.4 °F (36.3 °C)] 97.4 °F (36.3 °C)  Pulse:  [] 95  Resp:  [16-22] 16  SpO2:  [95 %-100 %] 100 %  BP: (109-128)/(56-62) 123/57     Weight: 104.8 kg (231 lb)  Body mass index is 45.11 kg/m².    Physical Exam   Constitutional: She is oriented to person, place, and time. She appears well-developed. No distress.   HENT:   Head: Normocephalic and atraumatic.   Eyes: Conjunctivae are normal. Pupils are equal, round, and reactive to light.   Neck: Neck supple. No tracheal deviation present.   Cardiovascular: Normal rate and regular rhythm.    Murmur heard.   Systolic murmur is present   Pulmonary/Chest: Effort normal and breath sounds normal. No respiratory distress.   Abdominal: Soft. She exhibits no distension. There is no tenderness.   Musculoskeletal: She exhibits edema. She exhibits no deformity.   Neurological: She is alert and oriented to person, place, and time.   Skin: Skin is warm and dry.   Psychiatric: She has a normal mood and affect.   Nursing note and vitals reviewed.       Significant Labs: All pertinent labs within the past 24 hours have been reviewed.    Significant Imaging: I have reviewed all pertinent imaging results/findings within the past 24 hours.   CTA Chest Non-Coronary (PE Study) 6/25/17: CT pulmonary embolism examination:  There are no filling defects  within the pulmonary artery suggesting pulmonary embolism.  There is nonspecific dilation of the left main pulmonary artery.  CT chest examination:  The thyroid gland is unremarkable.  The visualized portions of the base of the neck neck is within normal limits.  The trachea and central airways are within normal limits.  There is no evidence of bronchiectasis.  The heart is normal in appearance.  Coronary artery calcifications are present.  There are no pericardial effusions.  The thoracic aorta is normal in caliber.  Scattered calcifications of the thoracic aorta.  The great vessels arising from aortic arch are within normal limits.  There is no evidence of lymphadenopathy in the chest.  The axillary regions are within normal limits.  There are no pleural effusions.  There is no evidence of a pneumothorax.  Evaluation for lung nodules is difficult given patient motion.  There is mosaic attenuation of the pulmonary parenchyma.  No focal consolidations are identified.  The esophagus is within normal limits.  The visualized upper abdominal structures are unremarkable.  There is diffuse osteopenia making evaluation difficult.  No definitive fracture is identified.  No blastic lesion is present.  Chest wall is unremarkable.  Impression:   1.  No evidence of pulmonary embolism.  2.  Mosaic attenuation of the pulmonary parenchyma.  The findings may be seen with reactive airway disease or chronic thromboembolic disease.  Suggest clinical correlation.  3.  Additional findings as above.    Assessment/Plan:     * Sepsis secondary to UTI    Acute cystitis without hematuria  Give cefepime and IV saline.  Hold triameterene-hydrochlorothiazide.  Will need assistance to stand to urinate.  Follow up urine culture.  Consult Urology if necessary but already planning to see Urology at [facility] outpatient.          Hypomagnesemia    Give magnesium sulfate.          Holosystolic murmur    Echocardiogram to evaluate if related  to shortness of breath and peripheral edema.          Acute kidney injury    Give IV saline.          Hypokalemia    Give potassium chloride.          Chronic neoplasm-related pain    Continue hydrocodone-acetaminophen  mg q4h PRN.          Therapeutic opioid induced constipation    Takes lubiprostone 8 mcg BID and polyethylene glycol.  Continue.          Essential hypertension    Hold triamterene-hydrochlorothiazide 37.5-25 mg daily.          Acquired hypothyroidism    Continue levothyroxine 50 mcg daily.          Vulvar cancer    Not currently treated.  Followed by Dr. Dougie Sanchez.            VTE Risk Mitigation         Ordered     Medium Risk of VTE  Once      06/25/17 0348     Place MIRIAM hose  Until discontinued      06/25/17 0348     Place sequential compression device  Until discontinued      06/25/17 0348        Parviz Mccarthy MD  Department of Hospital Medicine   [facility]

## 2017-06-25 NOTE — ASSESSMENT & PLAN NOTE
Acute cystitis without hematuria  Give cefepime and IV saline.  Hold triameterene-hydrochlorothiazide.  Will need assistance to stand to urinate.  Follow up urine culture.  Consult Urology if necessary but already planning to see Urology at [facility] outpatient.

## 2017-06-25 NOTE — ED PROVIDER NOTES
Encounter Date: 6/24/2017       History     Chief Complaint   Patient presents with    Shortness of Breath     sob x 1 day, post nasal drip and congstion x 2 day, generally feeling ill with low grade temp. started on cipro a few days ago for uti but was never evaluated.       The history is provided by the patient.   Shortness of Breath   This is a new problem. The current episode started 1 to 2 hours ago. The problem has not changed since onset.Associated symptoms include a fever and leg swelling. Pertinent negatives include no cough, no chest pain and no vomiting. Associated symptoms comments: She had fever of 101.7 two days ago, which was attributed to a UTI.. Associated medical issues comments: Vulvar cancer.     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       bilateral. Sept 2015.      Family History   Problem Relation Age of Onset    Cancer Daughter      cervical cancer    Ovarian cancer Neg Hx     Uterine cancer Neg Hx     Breast cancer Neg Hx     Colon cancer Neg Hx      Social History   Substance Use Topics    Smoking status: Never Smoker    Smokeless tobacco: Not on file    Alcohol use No     Review of Systems   Constitutional: Positive for fever.   Respiratory: Positive for shortness of breath. Negative for cough.    Cardiovascular: Positive for leg swelling. Negative for chest pain.   Gastrointestinal:  Negative for diarrhea and vomiting.   Neurological: Positive for weakness. Negative for syncope.       Physical Exam     Initial Vitals [06/24/17 2100]   BP Pulse Resp Temp SpO2   128/62 103 (!) 22 -- 95 %      MAP       84         Physical Exam    Nursing note and vitals reviewed.  Constitutional: She appears distressed.   HENT:   Head: Normocephalic and atraumatic.   Eyes: EOM are normal.   Neck: Normal range of motion. Neck supple.   Cardiovascular: Normal rate, regular rhythm and normal heart sounds.   Pulmonary/Chest: Breath sounds normal.   Abdominal: Soft.   Musculoskeletal: Normal range of motion. She exhibits edema.   Neurological: She is alert and oriented to person, place, and time.   Skin: Skin is warm and dry.   Psychiatric: Her behavior is normal. Thought content normal.         ED Course   Procedures  Labs Reviewed   CBC W/ AUTO DIFFERENTIAL - Abnormal; Notable for the following:        Result Value    WBC 22.84 (*)     RBC 2.98 (*)     Hemoglobin 8.4 (*)     Hematocrit 26.8 (*)     MCHC 31.3 (*)     RDW 14.9 (*)     Gran # 20.9 (*)     Lymph # 0.4 (*)     Mono # 1.4 (*)     Gran% 91.7 (*)     Lymph% 1.9 (*)     All other components within normal limits   COMPREHENSIVE METABOLIC PANEL - Abnormal; Notable for the following:     Sodium 133 (*)     Potassium 3.1 (*)     Glucose 148 (*)     Albumin 2.0 (*)     Alkaline Phosphatase 137 (*)     eGFR if  53 (*)     eGFR if non  46 (*)     All other components within normal limits   PROTIME-INR - Abnormal; Notable for the following:     Prothrombin Time 13.3 (*)     INR 1.3 (*)     All other components within normal limits   B-TYPE NATRIURETIC PEPTIDE - Abnormal; Notable for the following:      (*)     All other components within normal limits   MAGNESIUM - Abnormal; Notable for the following:     Magnesium 1.0 (*)     All other components within normal limits   URINALYSIS - Abnormal; Notable for the following:      Protein, UA Trace (*)     Occult Blood UA 1+ (*)     Leukocytes, UA 3+ (*)     All other components within normal limits   URINALYSIS MICROSCOPIC - Abnormal; Notable for the following:     RBC, UA 12 (*)     WBC, UA >100 (*)     WBC Clumps, UA Moderate (*)     Bacteria, UA Moderate (*)     All other components within normal limits   TROPONIN I     EKG Readings: (Independently Interpreted)   Rhythm: Normal Sinus Rhythm. Heart Rate: 95. Conduction: RBBB. ST Segments: Normal ST Segments.     Imaging Results          CTA Chest Non-Coronary (PE Study) (Final result)  Result time 06/25/17 00:09:06    Final result by Papa Blancas MD (06/25/17 00:09:06)                 Impression:       1.  No evidence of pulmonary embolism.    2.  Mosaic attenuation of the pulmonary parenchyma.  The findings may be seen with reactive airway disease or chronic thromboembolic disease.  Suggest clinical correlation.    3.  Additional findings as above.              Electronically signed by: PAPA BLANCAS MD  Date:     06/25/17  Time:    00:09              Narrative:    Exam: [id number]  06/24/17  23:54:13 [id number] ([facility]) : CTA CHEST NON CORONARY    Technique:     CT images of the chest obtained during pulmonary arterial phase after injection of 100 cc Omnipaque IV contrast. Coronal and Sagittal Reformats were obtained.     Comparison:    7/3/12    Findings:      CT pulmonary embolism examination:  There are no filling defects within the pulmonary artery suggesting pulmonary embolism.  There is nonspecific dilation of the left main pulmonary artery.    CT chest examination:  The thyroid gland is unremarkable.  The visualized portions of the base of the neck neck is within normal limits.    The trachea and central airways are within normal limits.  There is no evidence of bronchiectasis.    The heart is normal in appearance.  Coronary artery calcifications are present.  There are no pericardial effusions.    The thoracic aorta is normal in caliber.   Scattered calcifications of the thoracic aorta.  The great vessels arising from aortic arch are within normal limits.    There is no evidence of lymphadenopathy in the chest.  The axillary regions are within normal limits.    There are no pleural effusions.  There is no evidence of a pneumothorax.  Evaluation for lung nodules is difficult given patient motion.  There is mosaic attenuation of the pulmonary parenchyma.  No focal consolidations are identified.    The esophagus is within normal limits.  The visualized upper abdominal structures are unremarkable.    There is diffuse osteopenia making evaluation difficult.  No definitive fracture is identified.  No blastic lesion is present.  Chest wall is unremarkable.                             X-Ray Chest 1 View (Final result)  Result time 06/24/17 21:43:20    Final result by Garrett Lee MD (06/24/17 21:43:20)                 Impression:     No acute abnormality.      Electronically signed by: GARRETT LEE MD  Date:     06/24/17  Time:    21:43              Narrative:    Chest, one view    Comparison: September 13, 2016    Findings: Lungs are clear. No effusion or pneumothorax. No displaced fracture. No change.                                                   ED Course     Clinical Impression:   The primary encounter diagnosis was Vulvar cancer. Diagnoses of Shortness of breath, Urinary tract infection without hematuria, site unspecified, Fever, unspecified fever cause, and Sepsis secondary to UTI were also pertinent to this visit.                           Jarret Meier MD  06/25/17 [id number]

## 2017-06-25 NOTE — ED NOTES
Patient has verified the spelling of their name and  on armband  LOC: The patient is awake, alert, and aware of environment with an appropriate affect, the patient is oriented x 4 and speaking appropriately, pt daughter states that pt takes percocet and daily due to cancer.   APPEARANCE:  patient is clean and well groomed, patient's clothing is properly fastened.   SKIN: The skin is warm and dry, color consistent with ethnicity  : denies any problems urinating   MUSCULOSKELETAL: Patient moving all extremities spontaneously, no obvious swelling or deformities noted. Pain to pelvic area due to cancer per pt daughter at bedside.   RESPIRATORY: Airway is open and patent, respirations are spontaneous, patient has a normal effort and rate, no accessory muscle use noted, bilateral breath sounds clear, complaints of  SOB, pt family states she was wheezing at home, no wheezing noted at present time   ABDOMEN: Soft and non tender to palpation, no distention noted, normoactive bowel sounds present in all four quadrants.   CARDIAC: heart murmur, denies chest pain

## 2017-06-26 ENCOUNTER — TELEPHONE (OUTPATIENT)
Dept: GYNECOLOGIC ONCOLOGY | Facility: CLINIC | Age: 72
End: 2017-06-26

## 2017-06-26 PROBLEM — E83.42 HYPOMAGNESEMIA: Status: RESOLVED | Noted: 2017-06-25 | Resolved: 2017-06-26

## 2017-06-26 PROBLEM — I35.0 AORTIC STENOSIS: Chronic | Status: ACTIVE | Noted: 2017-06-25

## 2017-06-26 PROBLEM — E87.6 HYPOKALEMIA: Status: RESOLVED | Noted: 2017-06-25 | Resolved: 2017-06-26

## 2017-06-26 LAB
ANION GAP SERPL CALC-SCNC: 6 MMOL/L
ANISOCYTOSIS BLD QL SMEAR: SLIGHT
AORTIC VALVE STENOSIS: ABNORMAL
BACTERIA UR CULT: NORMAL
BASOPHILS # BLD AUTO: ABNORMAL K/UL
BASOPHILS NFR BLD: 0 %
BUN SERPL-MCNC: 19 MG/DL
CALCIUM SERPL-MCNC: 8 MG/DL
CHLORIDE SERPL-SCNC: 97 MMOL/L
CO2 SERPL-SCNC: 24 MMOL/L
CREAT SERPL-MCNC: 1 MG/DL
DIASTOLIC DYSFUNCTION: YES
DIFFERENTIAL METHOD: ABNORMAL
EOSINOPHIL # BLD AUTO: ABNORMAL K/UL
EOSINOPHIL NFR BLD: 0 %
ERYTHROCYTE [DISTWIDTH] IN BLOOD BY AUTOMATED COUNT: 15 %
EST. GFR  (AFRICAN AMERICAN): >60 ML/MIN/1.73 M^2
EST. GFR  (NON AFRICAN AMERICAN): 57 ML/MIN/1.73 M^2
ESTIMATED PA SYSTOLIC PRESSURE: 45.25
GLUCOSE SERPL-MCNC: 139 MG/DL
HCT VFR BLD AUTO: 24.1 %
HGB BLD-MCNC: 7.8 G/DL
HYPOCHROMIA BLD QL SMEAR: ABNORMAL
LYMPHOCYTES # BLD AUTO: ABNORMAL K/UL
LYMPHOCYTES NFR BLD: 3 %
MAGNESIUM SERPL-MCNC: 2.3 MG/DL
MCH RBC QN AUTO: 27.8 PG
MCHC RBC AUTO-ENTMCNC: 32.4 %
MCV RBC AUTO: 86 FL
MITRAL VALVE MOBILITY: ABNORMAL
MONOCYTES # BLD AUTO: ABNORMAL K/UL
MONOCYTES NFR BLD: 2 %
NEUTROPHILS # BLD AUTO: ABNORMAL K/UL
NEUTROPHILS NFR BLD: 87 %
NEUTS BAND NFR BLD MANUAL: 8 %
OVALOCYTES BLD QL SMEAR: ABNORMAL
PLATELET # BLD AUTO: 237 K/UL
PLATELET BLD QL SMEAR: ABNORMAL
PMV BLD AUTO: 9 FL
POLYCHROMASIA BLD QL SMEAR: ABNORMAL
POTASSIUM SERPL-SCNC: 3.6 MMOL/L
RBC # BLD AUTO: 2.81 M/UL
RETIRED EF AND QEF - SEE NOTES: 60 (ref 55–65)
SODIUM SERPL-SCNC: 127 MMOL/L
TRICUSPID VALVE REGURGITATION: ABNORMAL
WBC # BLD AUTO: 25.9 K/UL

## 2017-06-26 PROCEDURE — 11000001 HC ACUTE MED/SURG PRIVATE ROOM

## 2017-06-26 PROCEDURE — 36415 COLL VENOUS BLD VENIPUNCTURE: CPT

## 2017-06-26 PROCEDURE — 63600175 PHARM REV CODE 636 W HCPCS: Performed by: HOSPITALIST

## 2017-06-26 PROCEDURE — 80048 BASIC METABOLIC PNL TOTAL CA: CPT

## 2017-06-26 PROCEDURE — 25000003 PHARM REV CODE 250: Performed by: HOSPITALIST

## 2017-06-26 PROCEDURE — 93306 TTE W/DOPPLER COMPLETE: CPT

## 2017-06-26 PROCEDURE — 85007 BL SMEAR W/DIFF WBC COUNT: CPT

## 2017-06-26 PROCEDURE — 85027 COMPLETE CBC AUTOMATED: CPT

## 2017-06-26 PROCEDURE — 83735 ASSAY OF MAGNESIUM: CPT

## 2017-06-26 PROCEDURE — 94761 N-INVAS EAR/PLS OXIMETRY MLT: CPT

## 2017-06-26 PROCEDURE — 93306 TTE W/DOPPLER COMPLETE: CPT | Mod: 26,,, | Performed by: INTERNAL MEDICINE

## 2017-06-26 RX ORDER — OXYCODONE HYDROCHLORIDE 5 MG/1
10 TABLET ORAL EVERY 4 HOURS PRN
Status: DISCONTINUED | OUTPATIENT
Start: 2017-06-26 | End: 2017-07-01 | Stop reason: HOSPADM

## 2017-06-26 RX ORDER — IBUPROFEN 400 MG/1
800 TABLET ORAL
Status: DISCONTINUED | OUTPATIENT
Start: 2017-06-26 | End: 2017-07-01 | Stop reason: HOSPADM

## 2017-06-26 RX ORDER — OXYCODONE HYDROCHLORIDE 5 MG/1
15 TABLET ORAL EVERY 4 HOURS PRN
Status: DISCONTINUED | OUTPATIENT
Start: 2017-06-26 | End: 2017-07-01 | Stop reason: HOSPADM

## 2017-06-26 RX ORDER — FLUTICASONE PROPIONATE 50 MCG
2 SPRAY, SUSPENSION (ML) NASAL DAILY
Status: DISCONTINUED | OUTPATIENT
Start: 2017-06-26 | End: 2017-07-01 | Stop reason: HOSPADM

## 2017-06-26 RX ADMIN — LUBIPROSTONE 8 MCG: 8 CAPSULE, GELATIN COATED ORAL at 06:06

## 2017-06-26 RX ADMIN — VANCOMYCIN HYDROCHLORIDE 1500 MG: 1 INJECTION, POWDER, LYOPHILIZED, FOR SOLUTION INTRAVENOUS at 07:06

## 2017-06-26 RX ADMIN — OXYCODONE HYDROCHLORIDE 10 MG: 5 TABLET ORAL at 10:06

## 2017-06-26 RX ADMIN — DOCUSATE SODIUM 100 MG: 100 CAPSULE, LIQUID FILLED ORAL at 09:06

## 2017-06-26 RX ADMIN — CEFEPIME HYDROCHLORIDE 1 G: 1 INJECTION, SOLUTION INTRAVENOUS at 06:06

## 2017-06-26 RX ADMIN — LUBIPROSTONE 8 MCG: 8 CAPSULE, GELATIN COATED ORAL at 07:06

## 2017-06-26 RX ADMIN — HYDROCODONE BITARTRATE AND ACETAMINOPHEN 1 TABLET: 10; 325 TABLET ORAL at 07:06

## 2017-06-26 RX ADMIN — CEFEPIME HYDROCHLORIDE 1 G: 1 INJECTION, SOLUTION INTRAVENOUS at 03:06

## 2017-06-26 RX ADMIN — POLYETHYLENE GLYCOL 3350 17 G: 17 POWDER, FOR SOLUTION ORAL at 09:06

## 2017-06-26 RX ADMIN — BUTALBITAL, ACETAMINOPHEN, AND CAFFEINE 1 TABLET: 50; 325; 40 TABLET ORAL at 12:06

## 2017-06-26 RX ADMIN — LEVOTHYROXINE SODIUM 50 MCG: 50 TABLET ORAL at 05:06

## 2017-06-26 RX ADMIN — HYDROCODONE BITARTRATE AND ACETAMINOPHEN 1 TABLET: 10; 325 TABLET ORAL at 03:06

## 2017-06-26 NOTE — TELEPHONE ENCOUNTER
I spoke with patient's daughter.   Hospitalized with UTI. Some degree of urethral obstruction.   I told her that a mendosa catheter may be necessary.     I also spoke with Dr. Ulloa.   He will get a urology consult.   May need a suprapubic catheter.   I told him that she may need sedation in the OR to place a mendosa.     She will discuss with Dr. Dariel Singh in urology at [facility].

## 2017-06-26 NOTE — PROGRESS NOTES
Urine gram stain showed GPC, so will start on vancomycin. Continue ceftriaxone. Follow cultures. Will get US of legs to rule out DVT given asymmetrical swelling. Her blood pressure is higher in the left arm than the right arm. She has systolic murmur. Check echo.     Jones Rocha MD, [facility]  Staff Hospitalist  Pager: [phone]  Spectralink: [phone]

## 2017-06-26 NOTE — PROGRESS NOTES
.Pharmacy New Medication Education    Patient accepted medication education.    Pharmacy educated patient on the following medications, using the teach-back method.   Apap  Fioricet  Cefepime  Colace  Nroco  Synthroid  Lubiprostone  Meclizine  Morphine  Miralax  Vancomycin    Learners of pharmacy medication education included:  patient    Patient +/- learner response:  verbalize understanding

## 2017-06-26 NOTE — PLAN OF CARE
Problem: Patient Care Overview  Goal: Plan of Care Review  Outcome: Ongoing (interventions implemented as appropriate)  Received pt on RA; placed on CPAP QHS. Pt cannot tolerated mask; daughter is going to get pt's personal mask from home. SAT 96%; will cont to monitor.

## 2017-06-26 NOTE — TELEPHONE ENCOUNTER
----- Message from Parvin Sloan sent at 6/26/2017  8:51 AM CDT -----  Contact: Alexandria (daughter)  Alexandria states the pt did not respond well to the Cipro she became sepsis and had to be hospitalized on Saturday night, she was admitted into [facility] and is currently still there    Contact number [phone]  Thanks

## 2017-06-26 NOTE — PHYSICIAN QUERY
PT Name: La Summers  MR #: [id number]    Physician Query Form -Integumentary  Clarification     CDS/: Darcy Alvarez               Contact information:    This form is a permanent document in the medical record.     Query Date: June 26, 2017    By submitting this query, we are merely seeking further clarification of documentation. Please utilize your independent clinical judgment when addressing the question(s) below.    The Medical record contains the following:   Indicator  Supporting Clinical Findings Location in Medical Record    Redness     X Decubitus, Pressure Ulcer, etc. Left buttocks Stage I Nursing Flow sheet 6/25    Deep Tissue Injury      Wound Care Consult      Medication      Treatment     X Other Turn patient every 2 hours MD orders 6/25     National Pressure Ulcer Advisory Panel (2007) Pressure Ulcer Definitions & Stages:    Stage I:  Intact skin with non-blanchable redness of a localized area usually over a bony prominence.   Stage II:  Partial thickness loss of dermis presenting as a shallow open ulcer with a red pink wound bed, without slough.   Stage III: Full thickness tissue loss. Subcutaneous fat may be visible but bone, tendon or muscle is not exposed. Slough may be present but does not obscure the depth of tissue loss. May include undermining and tunneling.   Stage IV: Full thickness tissue loss with exposed bone, tendon or muscle. Slough or eschar may be present on some parts of the wound bed. Often include undermining and tunneling.   Unstageable:   Full thickness tissue loss in which the base of the ulcer is covered by slough and/or eschar in the wound bed. Until enough slough and/or eschar is removed to expose the base of the wound, the true depth, and therefore stage, cannot be determined.   Deep Tissue Injury: Purple or maroon localized area of discolored intact skin or blood-filled blister due to damage of underlying soft tissue from  pressure and/or shear.     Provider, please  specify the diagnosis or diagnoses associated with above clinical findings.      [  ] Decubitus (Pressure) Ulcer / Deep Tissue Injury (please specify site, laterality & stage)    Site Laterality Stage   [ x ]Buttock [  ] Right            [ x ]Left 1   [  ]Coccyx [  ] Right            [  ]Left    [  ]Sacrum        [  ]Other Site (please specify):          [  ] Other Integumentary Diagnosis (please specify): ___________________________________  [  ] Clinically Undetermined    Please document in your progress notes daily for the duration of treatment until resolved and include in your discharge summary.

## 2017-06-27 ENCOUNTER — TELEPHONE (OUTPATIENT)
Dept: UROLOGY | Facility: CLINIC | Age: 72
End: 2017-06-27

## 2017-06-27 PROBLEM — N13.9: Chronic | Status: ACTIVE | Noted: 2017-06-24

## 2017-06-27 PROBLEM — N17.9 ACUTE KIDNEY INJURY: Status: RESOLVED | Noted: 2017-06-25 | Resolved: 2017-06-27

## 2017-06-27 PROBLEM — R06.02 SHORTNESS OF BREATH: Status: ACTIVE | Noted: 2017-06-24

## 2017-06-27 LAB
ANION GAP SERPL CALC-SCNC: 8 MMOL/L
ANISOCYTOSIS BLD QL SMEAR: SLIGHT
BASOPHILS # BLD AUTO: 0.03 K/UL
BASOPHILS NFR BLD: 0.1 %
BUN SERPL-MCNC: 14 MG/DL
CALCIUM SERPL-MCNC: 8.7 MG/DL
CHLORIDE SERPL-SCNC: 99 MMOL/L
CO2 SERPL-SCNC: 21 MMOL/L
CREAT SERPL-MCNC: 0.8 MG/DL
DIFFERENTIAL METHOD: ABNORMAL
EOSINOPHIL # BLD AUTO: 0.2 K/UL
EOSINOPHIL NFR BLD: 0.8 %
ERYTHROCYTE [DISTWIDTH] IN BLOOD BY AUTOMATED COUNT: 14.9 %
EST. GFR  (AFRICAN AMERICAN): >60 ML/MIN/1.73 M^2
EST. GFR  (NON AFRICAN AMERICAN): >60 ML/MIN/1.73 M^2
GLUCOSE SERPL-MCNC: 104 MG/DL
HCT VFR BLD AUTO: 26.1 %
HGB BLD-MCNC: 8.6 G/DL
LYMPHOCYTES # BLD AUTO: 0.8 K/UL
LYMPHOCYTES NFR BLD: 2.8 %
MAGNESIUM SERPL-MCNC: 2.2 MG/DL
MCH RBC QN AUTO: 28.2 PG
MCHC RBC AUTO-ENTMCNC: 33 %
MCV RBC AUTO: 86 FL
MONOCYTES # BLD AUTO: 1.7 K/UL
MONOCYTES NFR BLD: 6.2 %
NEUTROPHILS # BLD AUTO: 24.7 K/UL
NEUTROPHILS NFR BLD: 89 %
PHOSPHATE SERPL-MCNC: 2.8 MG/DL
PLATELET # BLD AUTO: 253 K/UL
PLATELET BLD QL SMEAR: ABNORMAL
PMV BLD AUTO: 8.9 FL
POTASSIUM SERPL-SCNC: 3.3 MMOL/L
RBC # BLD AUTO: 3.05 M/UL
SODIUM SERPL-SCNC: 128 MMOL/L
TROPONIN I SERPL DL<=0.01 NG/ML-MCNC: 0.01 NG/ML
VANCOMYCIN TROUGH SERPL-MCNC: 6.7 UG/ML
WBC # BLD AUTO: 27.77 K/UL

## 2017-06-27 PROCEDURE — 94761 N-INVAS EAR/PLS OXIMETRY MLT: CPT

## 2017-06-27 PROCEDURE — 99222 1ST HOSP IP/OBS MODERATE 55: CPT | Mod: ,,, | Performed by: UROLOGY

## 2017-06-27 PROCEDURE — 93005 ELECTROCARDIOGRAM TRACING: CPT

## 2017-06-27 PROCEDURE — 25000003 PHARM REV CODE 250: Performed by: HOSPITALIST

## 2017-06-27 PROCEDURE — 25000242 PHARM REV CODE 250 ALT 637 W/ HCPCS: Performed by: HOSPITALIST

## 2017-06-27 PROCEDURE — 93010 ELECTROCARDIOGRAM REPORT: CPT | Mod: S$GLB,,, | Performed by: INTERNAL MEDICINE

## 2017-06-27 PROCEDURE — 85025 COMPLETE CBC W/AUTO DIFF WBC: CPT

## 2017-06-27 PROCEDURE — 11000001 HC ACUTE MED/SURG PRIVATE ROOM

## 2017-06-27 PROCEDURE — 25000242 PHARM REV CODE 250 ALT 637 W/ HCPCS

## 2017-06-27 PROCEDURE — 80048 BASIC METABOLIC PNL TOTAL CA: CPT

## 2017-06-27 PROCEDURE — 99900035 HC TECH TIME PER 15 MIN (STAT)

## 2017-06-27 PROCEDURE — 63600175 PHARM REV CODE 636 W HCPCS: Performed by: HOSPITALIST

## 2017-06-27 PROCEDURE — 81000 URINALYSIS NONAUTO W/SCOPE: CPT

## 2017-06-27 PROCEDURE — 84484 ASSAY OF TROPONIN QUANT: CPT

## 2017-06-27 PROCEDURE — 80202 ASSAY OF VANCOMYCIN: CPT

## 2017-06-27 PROCEDURE — 63600175 PHARM REV CODE 636 W HCPCS: Performed by: RADIOLOGY

## 2017-06-27 PROCEDURE — 36415 COLL VENOUS BLD VENIPUNCTURE: CPT

## 2017-06-27 PROCEDURE — 83735 ASSAY OF MAGNESIUM: CPT

## 2017-06-27 PROCEDURE — 25000003 PHARM REV CODE 250

## 2017-06-27 PROCEDURE — 84100 ASSAY OF PHOSPHORUS: CPT

## 2017-06-27 PROCEDURE — 87086 URINE CULTURE/COLONY COUNT: CPT

## 2017-06-27 PROCEDURE — 94640 AIRWAY INHALATION TREATMENT: CPT

## 2017-06-27 PROCEDURE — 94660 CPAP INITIATION&MGMT: CPT

## 2017-06-27 PROCEDURE — 0T9B30Z DRAINAGE OF BLADDER WITH DRAINAGE DEVICE, PERCUTANEOUS APPROACH: ICD-10-PCS | Performed by: RADIOLOGY

## 2017-06-27 RX ORDER — MAG HYDROX/ALUMINUM HYD/SIMETH 200-200-20
30 SUSPENSION, ORAL (FINAL DOSE FORM) ORAL ONCE
Status: COMPLETED | OUTPATIENT
Start: 2017-06-27 | End: 2017-06-27

## 2017-06-27 RX ORDER — ALBUTEROL SULFATE 0.83 MG/ML
2.5 SOLUTION RESPIRATORY (INHALATION) EVERY 4 HOURS
Status: DISCONTINUED | OUTPATIENT
Start: 2017-06-27 | End: 2017-06-28

## 2017-06-27 RX ORDER — MIDAZOLAM HYDROCHLORIDE 1 MG/ML
INJECTION, SOLUTION INTRAMUSCULAR; INTRAVENOUS CODE/TRAUMA/SEDATION MEDICATION
Status: COMPLETED | OUTPATIENT
Start: 2017-06-27 | End: 2017-06-27

## 2017-06-27 RX ORDER — FENTANYL CITRATE 50 UG/ML
INJECTION, SOLUTION INTRAMUSCULAR; INTRAVENOUS CODE/TRAUMA/SEDATION MEDICATION
Status: COMPLETED | OUTPATIENT
Start: 2017-06-27 | End: 2017-06-27

## 2017-06-27 RX ORDER — NAPROXEN SODIUM 220 MG/1
324 TABLET, FILM COATED ORAL ONCE
Status: COMPLETED | OUTPATIENT
Start: 2017-06-27 | End: 2017-06-27

## 2017-06-27 RX ORDER — ASPIRIN 81 MG/1
TABLET ORAL
Status: COMPLETED
Start: 2017-06-27 | End: 2017-06-27

## 2017-06-27 RX ORDER — NITROGLYCERIN 0.4 MG/1
TABLET SUBLINGUAL
Status: COMPLETED
Start: 2017-06-27 | End: 2017-06-27

## 2017-06-27 RX ORDER — ALBUTEROL SULFATE 0.83 MG/ML
SOLUTION RESPIRATORY (INHALATION)
Status: COMPLETED
Start: 2017-06-27 | End: 2017-06-27

## 2017-06-27 RX ADMIN — FENTANYL CITRATE 50 MCG: 50 INJECTION, SOLUTION INTRAMUSCULAR; INTRAVENOUS at 11:06

## 2017-06-27 RX ADMIN — ALUMINUM HYDROXIDE, MAGNESIUM HYDROXIDE, AND SIMETHICONE 30 ML: 200; 200; 20 SUSPENSION ORAL at 01:06

## 2017-06-27 RX ADMIN — ALBUTEROL SULFATE 2.5 MG: 2.5 SOLUTION RESPIRATORY (INHALATION) at 11:06

## 2017-06-27 RX ADMIN — OXYCODONE HYDROCHLORIDE 10 MG: 5 TABLET ORAL at 04:06

## 2017-06-27 RX ADMIN — ALBUTEROL SULFATE 2.5 MG: 2.5 SOLUTION RESPIRATORY (INHALATION) at 01:06

## 2017-06-27 RX ADMIN — ASPIRIN 324 MG: 81 TABLET ORAL at 02:06

## 2017-06-27 RX ADMIN — VANCOMYCIN HYDROCHLORIDE 1000 MG: 1 INJECTION, POWDER, LYOPHILIZED, FOR SOLUTION INTRAVENOUS at 09:06

## 2017-06-27 RX ADMIN — ALBUTEROL SULFATE 2.5 MG: 2.5 SOLUTION RESPIRATORY (INHALATION) at 07:06

## 2017-06-27 RX ADMIN — MIDAZOLAM 1 MG: 1 INJECTION INTRAMUSCULAR; INTRAVENOUS at 11:06

## 2017-06-27 RX ADMIN — OXYCODONE HYDROCHLORIDE 10 MG: 5 TABLET ORAL at 09:06

## 2017-06-27 RX ADMIN — LEVOTHYROXINE SODIUM 50 MCG: 50 TABLET ORAL at 06:06

## 2017-06-27 RX ADMIN — CEFEPIME HYDROCHLORIDE 1 G: 1 INJECTION, SOLUTION INTRAVENOUS at 06:06

## 2017-06-27 NOTE — HPI
Patient is a 73 yo female with a h/o advanced vulvar cancer.  History is obtained from her daughter who is a nurse.  She reports a h/o endometrial cancer and is s/p hysterectomy in the past.  She also states she has had a cholecystectomy and appendectomy at some point she thinks but the details she is unsure of.  He presented to the hospital with fevers and sepsis secondary to a urinary tract infection.  Her white blood cell count has been significantly elevated.  Over the course of the past months, the patient has had increasing difficulty with urination.  She has had recurrence multiple times of vulvar cancer.  She is status post radiation and radical excision in the past.  She is DO NOT RESUSCITATE but her quality of life has diminished significantly due to pain with urination and difficulty with urination.  Her daughter describes having to manually retract her labia and inner thighs as well as  order to empty her bladder.    She has no abdominal imaging for review.  Her serum creatinine is within normal limits.

## 2017-06-27 NOTE — ASSESSMENT & PLAN NOTE
Acute cystitis without hematuria  Giving cefepime and vancomycin.  Gram stain showed GPCs in urine, but culture is negative thus far.  WBC count increasing.  Repeat urinalysis from new suprapubic catheter.  Holding triameterene-hydrochlorothiazide.

## 2017-06-27 NOTE — ASSESSMENT & PLAN NOTE
Does not want to take morphine elixir. Will change to oxycodone 10/15 prn with acetaminophen and ibuprofen.

## 2017-06-27 NOTE — CONSULTS
[facility]  History & Physical - Short Stay  Interventional Radiology    SUBJECTIVE:     Chief Complaint/Reason for Admission:painful urination, tumor    History of Present Illness:  La Summers is a 72 y.o. female with a history of vulvar cancer with painful urination.    OBJECTIVE:     Vital Signs (Most Recent):  Temp: 100.2 °F (37.9 °C) (called Nurse Joaquin to report temp nurse was busy) (06/27/17 0800)  Pulse: 86 (06/27/17 1220)  Resp: 15 (06/27/17 1220)  BP: (!) 91/59 (06/27/17 1220)  SpO2: 97 % (06/27/17 1220)    Physical Exam:  Awake, alert and oriented   In no acute distress  Pe,  Eomi  No labored breathing   Moves extremities spontaneously      ASSESSMENT/PLAN:     Painful urination.    Patient will undergo suprapubic catheter insertion.    Sedation/Anesthesia Assessment:  ASA Classification: III = Severe systemic disease limiting activity  Mallampati Score: II (hard and soft palate, upper portion of tonsils anduvula visible)    Sedation History: no problems    Sedation Plan: moderate

## 2017-06-27 NOTE — SUBJECTIVE & OBJECTIVE
Interval History: MET called after after suprapubic catheter placement.  Chest pain in center of chest, like pressure, with shortness of breath similar to with what she presented to the ED.  Audible wheeze.      Review of Systems   Constitutional: Negative for chills and fever.   Respiratory: Positive for shortness of breath. Negative for cough.    Cardiovascular: Positive for chest pain. Negative for palpitations.     Objective:     Vital Signs (Most Recent):  Temp: 100.2 °F (37.9 °C) (called Nurse Joaquin to report temp nurse was busy) (06/27/17 0800)  Pulse: 85 (06/27/17 1350)  Resp: 20 (06/27/17 1350)  BP: 122/63 (06/27/17 1346)  SpO2: 98 % (06/27/17 1350) Vital Signs (24h Range):  Temp:  [98.5 °F (36.9 °C)-100.2 °F (37.9 °C)] 100.2 °F (37.9 °C)  Pulse:  [85-99] 85  Resp:  [15-20] 20  SpO2:  [91 %-99 %] 98 %  BP: ()/(50-72) 122/63     Weight: 106.1 kg (234 lb)  Body mass index is 45.7 kg/m².    Intake/Output Summary (Last 24 hours) at 06/27/17 1424  Last data filed at 06/27/17 0420   Gross per 24 hour   Intake             1000 ml   Output             1089 ml   Net              -89 ml      Physical Exam   Constitutional: She is oriented to person, place, and time. She appears well-developed and well-nourished. She appears distressed.   Pulmonary/Chest: She has wheezes. She has no rhonchi. She has no rales.   Abdominal: Soft. Bowel sounds are normal. There is no tenderness.   Neurological: She is alert and oriented to person, place, and time.   Skin: Skin is warm and dry.   Psychiatric: She has a normal mood and affect. Her behavior is normal.   Nursing note and vitals reviewed.      Significant Labs:   CBC:     Recent Labs  Lab 06/26/17  0556 06/27/17  0501   WBC 25.90* 27.77*   HGB 7.8* 8.6*   HCT 24.1* 26.1*    253     CMP:     Recent Labs  Lab 06/26/17  0556 06/27/17  0501   * 128*   K 3.6 3.3*   CL 97 99   CO2 24 21*   * 104   BUN 19 14   CREATININE 1.0 0.8   CALCIUM 8.0* 8.7   ANIONGAP  6* 8   EGFRNONAA 57* >60     Magnesium:     Recent Labs  Lab 06/26/17  0556 06/27/17  0501   MG 2.3 2.2       Significant Imaging: I have reviewed all pertinent imaging results/findings within the past 24 hours.   US Lower Extremity Veins Bilateral 6/25/17: No definite evidence to suggest deep venous thrombosis within either lower extremity as visualized.  Limited evaluation of the left femoral vein due to edema patient body habitus.  Posterior tibial vein on the left also not well-seen.  US Upper Extremity Arteries Right 6/25/17: No sonographic evidence to suggest right subclavian artery stenosis.  2D echo with color flow doppler 6/26/17:    1 - Normal left ventricular systolic function (EF 60-65%).     2 - Moderate to severe aortic stenosis, GEORGE = 1.22 cm2, peak velocity = 3.88 m/s, mean gradient = 41 mmHg.     3 - Impaired LV relaxation, elevated LAP (grade 2 diastolic dysfunction).     4 - Pulmonary hypertension. The estimated PA systolic pressure is 45 mmHg.     5 - Concentric remodeling.     6 - The mitral valve is mildly sclerotic with mildly restricted leaflet mobility.   US Retroperitoneal Complete 6/27/17: Kidneys are difficult to visualize secondary to body habitus, particularly the left kidney.  The kidneys are normal in size measuring 10.9 cm on the right and 10.5 cm on the left. There is loss of normal corticomedullary differentiation which may be secondary to suboptimal visualization.  There is normal cortical thickness. No solid renal masses, nephrolithiasis, or hydronephrosis. Perfusion to the kidneys is decreased bilaterally. Resistive indices are normal on the right and elevated on the left and as follow: 0.68 on the right and 0.77 on the left. The urinary bladder appears normal.  X-Ray Chest AP Portable 6/27/17: There is no pneumothorax, pleural effusion or focal consolidation. The cardiac silhouette is enlarged.    Procedures:  IR Ultrasound Guidance 6/27/17: Sonographic guided placement of a  suprapubic catheter was performed.

## 2017-06-27 NOTE — ASSESSMENT & PLAN NOTE
-Given the severe anatomical distortion as a result of her vulvar cancer, it is unclear if a Monteiro catheter would be possible or practical moving forward for management of her bladder.  -Similarly, with no guarantee of any transurethral instrumentation possibility, it may be very difficult to perform a percutaneous suprapubic catheter without image guidance.  Therefore, we will consult interventional radiology.

## 2017-06-27 NOTE — ASSESSMENT & PLAN NOTE
Improved with the IV fluids. Will get renal US to see if there is any evidence of obstruction and retention.

## 2017-06-27 NOTE — PROGRESS NOTES
"MONSERRAT Alvarez, RN,  ARMANDO Agrawal, RN, CÉSAR Valdivia, RN, and SOFI Ayala, RN at bedside discussing patient's code status. Patient is AAOx4, able to state what month and year we are in and where she is at. Patient states "I want to be resuscitated at least one more time"   "

## 2017-06-27 NOTE — ASSESSMENT & PLAN NOTE
Not hypoxic.  Chest CTA on admission showed abnormal pulmonary parenchyma.  With wheeze, will treat as asthma with albuterol nebulizer treatments.  BNP was elevated with diastolic dysfunction, but no crackles on exam or pulmonary edema on chest x-ray, so hold off on diuretics for now with relatively low blood pressures.  EKG showed no ST or T wave changes and troponin was normal, so do not suspect cardiac ischemia.  Chest CTA on admission already ruled out PE.

## 2017-06-27 NOTE — SUBJECTIVE & OBJECTIVE
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       bilateral. Sept 2015.        Review of patient's allergies indicates:   Allergen Reactions    Adhesive     Latex, natural rubber      Skin irritation       Family History     Problem Relation (Age of Onset)    Cancer Daughter          Social History Main Topics    Smoking status: Never Smoker    Smokeless tobacco: Not on file    Alcohol use No    Drug use: No    Sexual activity: No       Review of Systems   Constitutional: Positive for fever and unexpected weight change.   Cardiovascular: Positive for leg swelling.   Gastrointestinal: Positive for abdominal pain.   Genitourinary: Positive for difficulty urinating, dysuria, pelvic pain, vaginal bleeding and vaginal pain.   All other systems reviewed and are negative.      Objective:     Temp:  [97.3 °F (36.3 °C)-99.7 °F (37.6 °C)] 99.4 °F (37.4 °C)  Pulse:  [87-99] 99  Resp:  [18-20] 18  SpO2:  [92 %-95 %] 95 %  BP: (105-163)/(53-72) 163/72     Body mass index is 45.7 kg/m².            Drains     Drain                 Closed/Suction Drain 02/19/15 1554 Left Thigh Bulb 15 Fr. 858 days         Closed/Suction Drain 02/19/15 1603 Left Perineal Bulb 15 Fr. 858 days                Physical Exam   Nursing note and vitals reviewed.  Constitutional: She is oriented to person, place, and time. Vital signs are normal. She appears well-developed and well-nourished. She is cooperative.   HENT:   Head: Normocephalic.   Neck: No tracheal deviation present.    Cardiovascular: Normal rate, intact distal pulses and normal pulses.    Pulmonary/Chest: Effort normal. No accessory muscle usage. No apnea and no tachypnea. No respiratory distress.   Abdominal: Soft. Normal appearance. She exhibits no distension, no fluid wave, no ascites and no mass. There is tenderness (mild) in the suprapubic area. There is no rebound and no CVA tenderness. No hernia.       obese   Genitourinary: There is tenderness and lesion on the right labia. There is tenderness and lesion on the left labia. Right adnexum displays no mass and no tenderness. Left adnexum displays no mass and no tenderness. There is tenderness in the vagina.   Genitourinary Comments: External genitalia with severe distortion and what appears to be eroding/gross tumor.  Unable to visualize urethra.   Musculoskeletal: Normal range of motion.   Lymphadenopathy:        Right: No inguinal adenopathy present.        Left: No inguinal adenopathy present.   Neurological: She is alert and oriented to person, place, and time. She has normal strength.   Skin: No bruising and no rash noted. She is not diaphoretic.     Psychiatric: She has a normal mood and affect. Her speech is normal and behavior is normal. Thought content normal.       Significant Labs:    BMP:    Recent Labs  Lab 06/24/17 2150 06/26/17  0556 06/27/17  0501   * 127* 128*   K 3.1* 3.6 3.3*   CL 95 97 99   CO2 25 24 21*   BUN 19 19 14   CREATININE 1.2 1.0 0.8   CALCIUM 8.7 8.0* 8.7       CBC:    Recent Labs  Lab 06/24/17 2150 06/26/17  0556 06/27/17  0501   WBC 22.84* 25.90* 27.77*   HGB 8.4* 7.8* 8.6*   HCT 26.8* 24.1* 26.1*    237 253       Urine Culture:   Recent Labs  Lab 06/25/17  0147   LABURIN No significant growth     Urine Studies:   Recent Labs  Lab 06/25/17  0016   COLORU Yellow   APPEARANCEUA Clear   PHUR 6.0   SPECGRAV 1.010   PROTEINUA Trace*   GLUCUA Negative   KETONESU Negative   BILIRUBINUA Negative   OCCULTUA 1+*   NITRITE Negative    UROBILINOGEN 1.0   LEUKOCYTESUR 3+*   RBCUA 12*   WBCUA >100*   BACTERIA Moderate*   SQUAMEPITHEL 15       Significant Imaging:  no abdominal imaging in [facility] since 2012.

## 2017-06-27 NOTE — PROGRESS NOTES
Patient returning from IR with complaints of chest pain. Patient rating pain 8/10, medial chest describing pain as a pressure, paged Dr. Mccarthy, he stated he would place orders in for patient. Vital Signs blood pressure 118/59, heart ratge 88, pulse ox 95% on CPAP

## 2017-06-27 NOTE — PLAN OF CARE
Problem: Patient Care Overview  Goal: Plan of Care Review  Outcome: Ongoing (interventions implemented as appropriate)  Pt in NAD. AAOX4. Scheduled meds given per MAR. PIV saline locked flushed for patency dressing CDI. Pt using bed pan reports urgency upon urination. Pt perineum, vulva, and thighs skin is swollen and tight. Pt report moderate pain relieved by PRN meds. CPAP at bedside worn during sleep. Daughter at bedside. Plan of care reviewed. Encouraged to call for assistance verbalized understanding. Safety maintained bed in low locked position, SR up X2, bed alarm on, and call bell in reach.

## 2017-06-27 NOTE — ASSESSMENT & PLAN NOTE
Does not want to take morphine elixir.  Changed to oxycodone 10-15 mg prn with acetaminophen and ibuprofen.

## 2017-06-27 NOTE — PROGRESS NOTES
"Daughter met with Dr. Mccarthy outside of the room to discuss code status. Daughter states "I'm all for full resuscitation, but this morning she said she wanted to be a DNR."   "

## 2017-06-27 NOTE — PT/OT/SLP PROGRESS
Physical Therapy      La NICHOLAS Kristopher  MRN: [id number]    Patient not seen today secondary to continued med testing secondary to chest pain. Will follow-up 6/28/17.    Lisa Montes, PT

## 2017-06-27 NOTE — PROGRESS NOTES
[facility] Hospital Medicine  Progress Note    Patient Name: La Summers  MRN: [id number]  Patient Class: IP- Inpatient   Admission Date: 6/24/2017  Length of Stay: 2 days  Attending Physician: Jones Rocha MD  Primary Care Provider: Suzette Mccray MD        Subjective:     Principal Problem:Sepsis secondary to UTI    HPI:  La Summers is a 72 y.o. 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  She lives in [place], [place] with her  and son.  Her daughter Alexandria is a nurse.  Her primary care physician is Dr. Mary Ann Mccray.  Her gynecologic oncologist is Dr. Dougie Sanchez.      Dr. Sanchez prescribed her ciprofloxacin for a UTI on 6/23/17.  She had fever of 101.7 F at the time.  She went to [facility] ED on 6/24/17 with shortness of breath that began in the afternoon, with mucous congestion.  She had poor appetite and felt too weak to stand.  Due to mechanical obstruction by her cancer, she must stand to urinate.  Dr. Sanchez had referred to her to urologist Dr. Savage Quinonez to address this worsening problem.  She had bilateral leg edema.  She was not hypoxic.  She was tachypnic (respiratory rate 22) and put on nasal cannula.  Blood pressure was 109/56.  Labs showed leukocytosis (WBC 22,840), anemia (hemoglobin 8.4, hematocrit 26.8), hypokalemia (3.1), acute kidney injury (creatinine 1.2 from 0.8 on 9/13/16), hypoalbuminemia (2.0), elevated BNP (298).  Urinalysis had >100 WBC/hpf, moderate WBC clumps, and moderate  bacteria.  Chest CTA showed no pulmonary embolism, but showed nonspecific dilation of the left pulmonary artery, and mosaic pattern of the pulmonary parenchyma suggesting reactive airway disease or chronic thromboembolic disease.  She denied history of ever having pulmonary embolism.  She was given IV morphine, ondansetron, and normal saline, and admitted to [facility].  She was also found to have severe hypomagnesemia (1.0).    Hospital Course:  Echocardiogram showed moderate to severe aortic stenosis (GEORGE = 1.22 cm2, peak velocity = 3.88 m/s, mean gradient = 41 mmHg) and pulmonary hypertension.  Urine culture had no growth.      Interval History: Overall, she is feeling better today. Still with some sinus congestion and a headache. Breathing is improved. Still having difficulty with pain and getting into a position to urinate. Spoke with Dr. Sanchez (Gyn-Onc) today and he asked about having Urology see the patient to discuss options for urinary diversion as she is likely to develop urinary retention in the future.     Review of Systems   Constitutional: Negative for chills and fever.   HENT: Positive for congestion.    Respiratory: Negative for cough and shortness of breath.    Cardiovascular: Negative for chest pain and palpitations.   Gastrointestinal: Negative for nausea and vomiting.     Objective:     Vital Signs (Most Recent):  Temp: 98.6 °F (37 °C) (06/27/17 0020)  Pulse: 87 (06/27/17 0020)  Resp: 18 (06/27/17 0020)  BP: 117/62 (06/27/17 0020)  SpO2: (!) 93 % (06/26/17 2321) Vital Signs (24h Range):  Temp:  [97.3 °F (36.3 °C)-99.7 °F (37.6 °C)] 98.6 °F (37 °C)  Pulse:  [87-98] 87  Resp:  [18-20] 18  SpO2:  [92 %-94 %] 93 %  BP: (105-120)/(53-62) 117/62     Weight: 106.4 kg (234 lb 8 oz)  Body mass index is 45.8 kg/m².    Intake/Output Summary (Last 24 hours) at 06/27/17 0317  Last data filed at 06/26/17 1851   Gross per 24 hour   Intake              960 ml   Output              221 ml   Net               739 ml      Physical Exam   Constitutional: She is oriented to person, place, and time. She appears well-developed and well-nourished. No distress.   Abdominal: Soft. Bowel sounds are normal. There is no tenderness.   Neurological: She is alert and oriented to person, place, and time.   Skin: Skin is warm and dry.   Psychiatric: She has a normal mood and affect. Her behavior is normal.   Nursing note and vitals reviewed.      Significant Labs:   CBC:   Recent Labs  Lab 06/26/17  0556   WBC 25.90*   HGB 7.8*   HCT 24.1*        CMP:   Recent Labs  Lab 06/26/17  0556   *   K 3.6   CL 97   CO2 24   *   BUN 19   CREATININE 1.0   CALCIUM 8.0*   ANIONGAP 6*   EGFRNONAA 57*     Magnesium:   Recent Labs  Lab 06/26/17  0556   MG 2.3       Significant Imaging: I have reviewed all pertinent imaging results/findings within the past 24 hours.    Assessment/Plan:      * Sepsis secondary to UTI    Acute cystitis without hematuria  Give cefepime and vancomycin. Gram stain showed GPCs in urine, but culture is negative thus far. Hold triameterene-hydrochlorothiazide.         Aortic stenosis    LV function is good. AS is stable and could be contributing to her symptoms.         Acute kidney injury    Improved with the IV fluids. Will get renal US to see if there is any evidence of obstruction and retention.          Chronic neoplasm-related pain    Does not want to take morphine elixir. Will change to oxycodone 10/15 prn with acetaminophen and ibuprofen.           Therapeutic opioid induced constipation    Takes lubiprostone 8 mcg BID and polyethylene glycol.  Continue.          Essential hypertension    Hold triamterene-hydrochlorothiazide 37.5-25 mg daily.          Acquired hypothyroidism    Continue levothyroxine 50 mcg daily.          Vulvar cancer    Not currently treated.  Followed by Dr. Dougie Sanchez. Will have Urology see her about possible urinary diversion given issues with urination.              VTE Risk Mitigation         Ordered     Medium Risk of VTE  Once      06/25/17 0348     Place MIRIAM hose  Until discontinued      06/25/17 0348     Place sequential compression device  Until discontinued      06/25/17 0348          Jones Rocha MD  Department of Hospital Medicine   [facility]

## 2017-06-27 NOTE — SUBJECTIVE & OBJECTIVE
Interval History: Overall, she is feeling better today. Still with some sinus congestion and a headache. Breathing is improved. Still having difficulty with pain and getting into a position to urinate. Spoke with Dr. Sanchez (Gyn-Onc) today and he asked about having Urology see the patient to discuss options for urinary diversion as she is likely to develop urinary retention in the future.     Review of Systems   Constitutional: Negative for chills and fever.   HENT: Positive for congestion.    Respiratory: Negative for cough and shortness of breath.    Cardiovascular: Negative for chest pain and palpitations.   Gastrointestinal: Negative for nausea and vomiting.     Objective:     Vital Signs (Most Recent):  Temp: 98.6 °F (37 °C) (06/27/17 0020)  Pulse: 87 (06/27/17 0020)  Resp: 18 (06/27/17 0020)  BP: 117/62 (06/27/17 0020)  SpO2: (!) 93 % (06/26/17 2321) Vital Signs (24h Range):  Temp:  [97.3 °F (36.3 °C)-99.7 °F (37.6 °C)] 98.6 °F (37 °C)  Pulse:  [87-98] 87  Resp:  [18-20] 18  SpO2:  [92 %-94 %] 93 %  BP: (105-120)/(53-62) 117/62     Weight: 106.4 kg (234 lb 8 oz)  Body mass index is 45.8 kg/m².    Intake/Output Summary (Last 24 hours) at 06/27/17 0317  Last data filed at 06/26/17 1851   Gross per 24 hour   Intake              960 ml   Output              221 ml   Net              739 ml      Physical Exam   Constitutional: She is oriented to person, place, and time. She appears well-developed and well-nourished. No distress.   Abdominal: Soft. Bowel sounds are normal. There is no tenderness.   Neurological: She is alert and oriented to person, place, and time.   Skin: Skin is warm and dry.   Psychiatric: She has a normal mood and affect. Her behavior is normal.   Nursing note and vitals reviewed.      Significant Labs:   CBC:   Recent Labs  Lab 06/26/17  0556   WBC 25.90*   HGB 7.8*   HCT 24.1*        CMP:   Recent Labs  Lab 06/26/17  0556   *   K 3.6   CL 97   CO2 24   *   BUN 19    CREATININE 1.0   CALCIUM 8.0*   ANIONGAP 6*   EGFRNONAA 57*     Magnesium:   Recent Labs  Lab 06/26/17  0556   MG 2.3       Significant Imaging: I have reviewed all pertinent imaging results/findings within the past 24 hours.

## 2017-06-27 NOTE — PLAN OF CARE
TN rounded on pt   pt on bipap -   daughter Alexandria at bedside     No current HH, has Quad Cane/C-PAP, prior to admit was independent, family will provide transportation home     Chaplain Orellana to meet with pt to answer questions re:  advance directives.          06/27/17 1302   Discharge Reassessment   Assessment Type Discharge Planning Reassessment   Can the patient answer the patient profile reliably? Yes, cognitively intact   Describe the patient's ability to walk at the present time. Major restrictions/daily assistance from another person   How often would a person be available to care for the patient? Whenever needed   Number of comorbid conditions (as recorded on the chart) Five or more   Discharge Plan A Home;Home with family   Discharge Plan B Home;Home with family;Home Health   Change in patient condition or support system No   Patient choice form signed by patient/caregiver N/A   Explained to the the patient/caregiver why the discharge planned changed: (na)   Involved the patient/caregiver in establishing a new discharge plan: (na)

## 2017-06-27 NOTE — PROGRESS NOTES
.Pharmacy New Medication Education    Patient accepted medication education.    Pharmacy educated patient on the following medications, using the teach-back method.   Flonase  Ibuprofen  oxy  Learners of pharmacy medication education included:  patient    Patient +/- learner response:  verbalize understanding

## 2017-06-27 NOTE — CONSULTS
[facility]  Urology  Consult Note    Patient Name: La Summers  MRN: [id number]  Admission Date: 6/24/2017  Hospital Length of Stay: 2   Code Status: Full Code   Attending Provider: Parviz Mccarthy MD   Consulting Provider: Dariel Cui MD  Primary Care Physician: Suzette Mccray MD  Principal Problem:Sepsis secondary to UTI    Inpatient consult to Urology  Consult performed by: DARIEL CUI  Consult ordered by: JANA ROLLE  Reason for consult: Painful and difficult urination  Assessment/Recommendations: Consult IR for possible percutaneous suprapubic catheter insertion.          Subjective:     HPI:  Patient is a 71 yo female with a h/o advanced vulvar cancer.  History is obtained from her daughter who is a nurse.  She reports a h/o endometrial cancer and is s/p hysterectomy in the past.  She also states she has had a cholecystectomy and appendectomy at some point she thinks but the details she is unsure of.  He presented to the hospital with fevers and sepsis secondary to a urinary tract infection.  Her white blood cell count has been significantly elevated.  Over the course of the past months, the patient has had increasing difficulty with urination.  She has had recurrence multiple times of vulvar cancer.  She is status post radiation and radical excision in the past.  She is DO NOT RESUSCITATE but her quality of life has diminished significantly due to pain with urination and difficulty with urination.  Her daughter describes having to manually retract her labia and inner thighs as well as  order to empty her bladder.    She has no abdominal imaging for review.  Her serum creatinine is within normal limits.    Past Medical History:   Diagnosis Date    Acute cystitis without hematuria 6/23/2017    Hypertension     Sleep apnea     Therapeutic opioid induced constipation 7/15/2016    Thyroid disease     Uterine cancer     Vulval ca     Vulvar dysplasia  9/15/2015       Past Surgical History:   Procedure Laterality Date    BILATERAL SALPINGOOPHORECTOMY  2009    CHOLECYSTECTOMY  1970    HYSTERECTOMY  2009    INGUINAL EXPLORATION  Oct 2011    bilateral lymphadenectomy    RADICAL VULVECTOMY      vulvar excision.       bilateral. Sept 2015.        Review of patient's allergies indicates:   Allergen Reactions    Adhesive     Latex, natural rubber      Skin irritation       Family History     Problem Relation (Age of Onset)    Cancer Daughter          Social History Main Topics    Smoking status: Never Smoker    Smokeless tobacco: Not on file    Alcohol use No    Drug use: No    Sexual activity: No       Review of Systems   Constitutional: Positive for fever and unexpected weight change.   Cardiovascular: Positive for leg swelling.   Gastrointestinal: Positive for abdominal pain.   Genitourinary: Positive for difficulty urinating, dysuria, pelvic pain, vaginal bleeding and vaginal pain.   All other systems reviewed and are negative.      Objective:     Temp:  [97.3 °F (36.3 °C)-99.7 °F (37.6 °C)] 99.4 °F (37.4 °C)  Pulse:  [87-99] 99  Resp:  [18-20] 18  SpO2:  [92 %-95 %] 95 %  BP: (105-163)/(53-72) 163/72     Body mass index is 45.7 kg/m².            Drains     Drain                 Closed/Suction Drain 02/19/15 1554 Left Thigh Bulb 15 Fr. 858 days         Closed/Suction Drain 02/19/15 1603 Left Perineal Bulb 15 Fr. 858 days                Physical Exam   Nursing note and vitals reviewed.  Constitutional: She is oriented to person, place, and time. Vital signs are normal. She appears well-developed and well-nourished. She is cooperative.   HENT:   Head: Normocephalic.   Neck: No tracheal deviation present.   Cardiovascular: Normal rate, intact distal pulses and normal pulses.    Pulmonary/Chest: Effort normal. No accessory muscle usage. No apnea and no tachypnea. No respiratory distress.   Abdominal: Soft. Normal appearance. She exhibits no distension, no  fluid wave, no ascites and no mass. There is tenderness (mild) in the suprapubic area. There is no rebound and no CVA tenderness. No hernia.       obese   Genitourinary: There is tenderness and lesion on the right labia. There is tenderness and lesion on the left labia. Right adnexum displays no mass and no tenderness. Left adnexum displays no mass and no tenderness. There is tenderness in the vagina.   Genitourinary Comments: External genitalia with severe distortion and what appears to be eroding/gross tumor.  Unable to visualize urethra.   Musculoskeletal: Normal range of motion.   Lymphadenopathy:        Right: No inguinal adenopathy present.        Left: No inguinal adenopathy present.   Neurological: She is alert and oriented to person, place, and time. She has normal strength.   Skin: No bruising and no rash noted. She is not diaphoretic.     Psychiatric: She has a normal mood and affect. Her speech is normal and behavior is normal. Thought content normal.       Significant Labs:    BMP:    Recent Labs  Lab 06/24/17 2150 06/26/17  0556 06/27/17  0501   * 127* 128*   K 3.1* 3.6 3.3*   CL 95 97 99   CO2 25 24 21*   BUN 19 19 14   CREATININE 1.2 1.0 0.8   CALCIUM 8.7 8.0* 8.7       CBC:    Recent Labs  Lab 06/24/17 2150 06/26/17  0556 06/27/17  0501   WBC 22.84* 25.90* 27.77*   HGB 8.4* 7.8* 8.6*   HCT 26.8* 24.1* 26.1*    237 253       Urine Culture:   Recent Labs  Lab 06/25/17  0147   LABURIN No significant growth     Urine Studies:   Recent Labs  Lab 06/25/17  0016   COLORU Yellow   APPEARANCEUA Clear   PHUR 6.0   SPECGRAV 1.010   PROTEINUA Trace*   GLUCUA Negative   KETONESU Negative   BILIRUBINUA Negative   OCCULTUA 1+*   NITRITE Negative   UROBILINOGEN 1.0   LEUKOCYTESUR 3+*   RBCUA 12*   WBCUA >100*   BACTERIA Moderate*   SQUAMEPITHEL 15       Significant Imaging:  no abdominal imaging in [facility] since 2012.                    Assessment and Plan:     Acute kidney injury     -Resolved.  Serum creatinine normal.  -Renal bladder ultrasound ordered but pending          Chronic neoplasm-related pain    -Pain management per primary team          Vulvar cancer    -Given the severe anatomical distortion as a result of her vulvar cancer, it is unclear if a Monteiro catheter would be possible or practical moving forward for management of her bladder.  -Similarly, with no guarantee of any transurethral instrumentation possibility, it may be very difficult to perform a percutaneous suprapubic catheter without image guidance.  Therefore, we will consult interventional radiology.        * Sepsis secondary to UTI    -Antibiotics per primary team.  Urine culture reviewed and negative.  However, leukocytosis continues to increase.  -Agree with palliative diversion of urine likely with a suprapubic catheter.  If this is not feasible, as a last resort the patient couldn't have nephrostomy tubes placed.  However, urologic suprapubic catheter would likely require a large open incision with placement of the catheter under direct vision given a potential inability to catheterize transurethrally in order to distend the bladder or perform suprapubic tube placement under cystoscopic vision.  Therefore, we will consult interventional radiology for possible percutaneous suprapubic catheter placement under image guidance.            VTE Risk Mitigation         Ordered     Medium Risk of VTE  Once      06/25/17 0348     Place MIRIAM hose  Until discontinued      06/25/17 0348     Place sequential compression device  Until discontinued      06/25/17 0348          Thank you for your consult. I will follow-up with patient. Please contact us if you have any additional questions.    Dariel Singh MD  Urology  [facility]

## 2017-06-27 NOTE — TELEPHONE ENCOUNTER
----- Message from Shelley Rushing sent at 2017  8:21 AM CDT -----  Contact: MARCEL Starr [[id number]]   1945   # [id number]  Ref dr johnson  Reason  uriniary diversion    Contact [facility] [phone]

## 2017-06-27 NOTE — PROGRESS NOTES
MET called. Patient was experiencing chest pain and telemetry monitor was showing some ST elevation.

## 2017-06-27 NOTE — PT/OT/SLP PROGRESS
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 Will follow-up for evaluation as able.    Karlie Spencer, PT   6/27/17

## 2017-06-27 NOTE — PROGRESS NOTES
[facility] Hospital Medicine  Progress Note    Patient Name: La Summers  MRN: [id number]  Patient Class: IP- Inpatient   Admission Date: 6/24/2017  Length of Stay: 2 days  Attending Physician: Parviz Mccarthy MD  Primary Care Provider: Suzette Mccray MD        Subjective:     Principal Problem:Sepsis secondary to UTI    HPI:  La Summers is a 72 y.o. 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  She lives in [place], [place] with her  and son.  Her daughter Alexandria is a nurse.  Her primary care physician is Dr. Mary Ann Mccray.  Her gynecologic oncologist is Dr. Dougie Sanchez.      Dr. Sanchez prescribed her ciprofloxacin for a UTI on 6/23/17.  She had fever of 101.7 F at the time.  She went to [facility] ED on 6/24/17 with shortness of breath that began in the afternoon, with mucous congestion.  She had poor appetite and felt too weak to stand.  Due to mechanical obstruction by her cancer, she must stand to urinate.  Dr. Sanchez had referred to her to urologist Dr. Savage Quinonez to address this worsening problem.  She had bilateral leg edema.  She was not hypoxic.  She was tachypnic (respiratory rate 22) and put on nasal cannula.  Blood pressure was 109/56.  Labs showed leukocytosis (WBC 22,840), anemia (hemoglobin 8.4, hematocrit 26.8), hypokalemia (3.1), acute kidney injury (creatinine 1.2 from 0.8 on 9/13/16), hypoalbuminemia (2.0), elevated BNP (298).  Urinalysis had >100  WBC/hpf, moderate WBC clumps, and moderate bacteria.  Chest CTA showed no pulmonary embolism, but showed nonspecific dilation of the left pulmonary artery, and mosaic pattern of the pulmonary parenchyma suggesting reactive airway disease or chronic thromboembolic disease.  She denied history of ever having asthma, COPD, or pulmonary embolism.  She was given IV morphine, ondansetron, and normal saline, and admitted to [facility].  She was also found to have severe hypomagnesemia (1.0).    Hospital Course:  Echocardiogram showed moderate to severe aortic stenosis (GEORGE = 1.22 cm2, peak velocity = 3.88 m/s, mean gradient = 41 mmHg), diastolic dysfunction, and pulmonary hypertension.  She was given cefepime and vancomycin.  Urine culture had no growth but WBC count increased.  Renal function improved to normal.  She was given magnesium until level was normal.  Her case was discussed with Dr. Sanchez, who requested that Urology see her.  Urologist Dr. Dariel Singh advised suprapubic catheter placement by Interventional Radiology to avoid a large open incision, and it was done by radiologist Dr. Rinku Laws.  After the procedure she had chest pain and shortness of breath, with wheeze on exam.  Oxygen saturation was 98% on room air.  EKG showed right bundle branch block.  Troponin was normal.  Chest x-ray was unremarkable as it was on admission.    Interval History: MET called after after suprapubic catheter placement.  Chest pain in center of chest, like pressure, with shortness of breath similar to with what she presented to the ED.  Audible wheeze.      Review of Systems   Constitutional: Negative for chills and fever.   Respiratory: Positive for shortness of breath. Negative for cough.    Cardiovascular: Positive for chest pain. Negative for palpitations.     Objective:     Vital Signs (Most Recent):  Temp: 100.2 °F (37.9 °C) (called Nurse Joaquin to report temp nurse was busy) (06/27/17 0800)  Pulse: 85  (06/27/17 1350)  Resp: 20 (06/27/17 1350)  BP: 122/63 (06/27/17 1346)  SpO2: 98 % (06/27/17 1350) Vital Signs (24h Range):  Temp:  [98.5 °F (36.9 °C)-100.2 °F (37.9 °C)] 100.2 °F (37.9 °C)  Pulse:  [85-99] 85  Resp:  [15-20] 20  SpO2:  [91 %-99 %] 98 %  BP: ()/(50-72) 122/63     Weight: 106.1 kg (234 lb)  Body mass index is 45.7 kg/m².    Intake/Output Summary (Last 24 hours) at 06/27/17 1424  Last data filed at 06/27/17 0420   Gross per 24 hour   Intake             1000 ml   Output             1089 ml   Net              -89 ml      Physical Exam   Constitutional: She is oriented to person, place, and time. She appears well-developed and well-nourished. She appears distressed.   Pulmonary/Chest: She has wheezes. She has no rhonchi. She has no rales.   Abdominal: Soft. Bowel sounds are normal. There is no tenderness.   Neurological: She is alert and oriented to person, place, and time.   Skin: Skin is warm and dry.   Psychiatric: She has a normal mood and affect. Her behavior is normal.   Nursing note and vitals reviewed.      Significant Labs:   CBC:     Recent Labs  Lab 06/26/17  0556 06/27/17  0501   WBC 25.90* 27.77*   HGB 7.8* 8.6*   HCT 24.1* 26.1*    253     CMP:     Recent Labs  Lab 06/26/17  0556 06/27/17  0501   * 128*   K 3.6 3.3*   CL 97 99   CO2 24 21*   * 104   BUN 19 14   CREATININE 1.0 0.8   CALCIUM 8.0* 8.7   ANIONGAP 6* 8   EGFRNONAA 57* >60     Magnesium:     Recent Labs  Lab 06/26/17  0556 06/27/17  0501   MG 2.3 2.2       Significant Imaging: I have reviewed all pertinent imaging results/findings within the past 24 hours.   US Lower Extremity Veins Bilateral 6/25/17: No definite evidence to suggest deep venous thrombosis within either lower extremity as visualized.  Limited evaluation of the left femoral vein due to edema patient body habitus.  Posterior tibial vein on the left also not well-seen.  US Upper Extremity Arteries Right 6/25/17: No sonographic evidence to  suggest right subclavian artery stenosis.  2D echo with color flow doppler 6/26/17:    1 - Normal left ventricular systolic function (EF 60-65%).     2 - Moderate to severe aortic stenosis, GEORGE = 1.22 cm2, peak velocity = 3.88 m/s, mean gradient = 41 mmHg.     3 - Impaired LV relaxation, elevated LAP (grade 2 diastolic dysfunction).     4 - Pulmonary hypertension. The estimated PA systolic pressure is 45 mmHg.     5 - Concentric remodeling.     6 - The mitral valve is mildly sclerotic with mildly restricted leaflet mobility.   US Retroperitoneal Complete 6/27/17: Kidneys are difficult to visualize secondary to body habitus, particularly the left kidney.  The kidneys are normal in size measuring 10.9 cm on the right and 10.5 cm on the left. There is loss of normal corticomedullary differentiation which may be secondary to suboptimal visualization.  There is normal cortical thickness. No solid renal masses, nephrolithiasis, or hydronephrosis. Perfusion to the kidneys is decreased bilaterally. Resistive indices are normal on the right and elevated on the left and as follow: 0.68 on the right and 0.77 on the left. The urinary bladder appears normal.  X-Ray Chest AP Portable 6/27/17: There is no pneumothorax, pleural effusion or focal consolidation. The cardiac silhouette is enlarged.    Procedures:  IR Ultrasound Guidance 6/27/17: Sonographic guided placement of a suprapubic catheter was performed.    Assessment/Plan:      * Sepsis secondary to UTI    Acute cystitis without hematuria  Giving cefepime and vancomycin.  Gram stain showed GPCs in urine, but culture is negative thus far.  WBC count increasing.  Repeat urinalysis from new suprapubic catheter.  Holding triameterene-hydrochlorothiazide.         Shortness of breath    Not hypoxic.  Chest CTA on admission showed abnormal pulmonary parenchyma.  With wheeze, will treat as asthma with albuterol nebulizer treatments.  BNP was elevated with diastolic dysfunction, but  no crackles on exam or pulmonary edema on chest x-ray, so hold off on diuretics for now with relatively low blood pressures.  EKG showed no ST or T wave changes and troponin was normal, so do not suspect cardiac ischemia.  Chest CTA on admission already ruled out PE.          Aortic stenosis    LV function is good.  AS is stable and could be contributing to her symptoms.         Chronic neoplasm-related pain    Does not want to take morphine elixir.  Changed to oxycodone 10-15 mg prn with acetaminophen and ibuprofen.           Therapeutic opioid induced constipation    Takes lubiprostone 8 mcg BID and polyethylene glycol.  Continue.          Essential hypertension    Holding triamterene-hydrochlorothiazide 37.5-25 mg daily.          Acquired hypothyroidism    Continue levothyroxine 50 mcg daily.          Obstructive sleep apnea on CPAP    Continue CPAP nightly.          Vulvar cancer    Mechanical obstruction of urinary tract  Not currently treated.  Followed by Dr. Dougie Sanchez.  Appreciate Urology and IR.  Suprapubic catheter placed.              VTE Risk Mitigation         Ordered     Medium Risk of VTE  Once      06/25/17 0348     Place MIRIAM hose  Until discontinued      06/25/17 0348     Place sequential compression device  Until discontinued      06/25/17 0348          Parviz Mcacrthy MD  Department of Hospital Medicine   [facility]

## 2017-06-27 NOTE — ASSESSMENT & PLAN NOTE
Acute cystitis without hematuria  Give cefepime and vancomycin. Gram stain showed GPCs in urine, but culture is negative thus far. Hold triameterene-hydrochlorothiazide.

## 2017-06-27 NOTE — ASSESSMENT & PLAN NOTE
Not currently treated.  Followed by Dr. Dougie Sanchez. Will have Urology see her about possible urinary diversion given issues with urination.

## 2017-06-27 NOTE — HOSPITAL COURSE
Echocardiogram showed moderate to severe aortic stenosis (GEORGE = 1.22 cm2, peak velocity = 3.88 m/s, mean gradient = 41 mmHg), diastolic dysfunction, and pulmonary hypertension.  She was given cefepime and vancomycin.  Urine culture had no growth but WBC count increased.  Renal function improved to normal.  She was given magnesium until level was normal.  Her case was discussed with Dr. Sanchez, who requested that Urology see her.  Urologist Dr. Dariel Singh advised suprapubic catheter placement by Interventional Radiology to avoid a large open incision, and it was done by radiologist Dr. Rinku Laws.  After the procedure she had chest pain and shortness of breath, with wheeze on exam.  Oxygen saturation was 98% on room air.  EKG showed right bundle branch block.  Troponin was normal.  Chest x-ray was unremarkable as it was on admission.  She was treated with albuterol nebulizer treatments, which improved her wheeze.  Repeat urinalysis from the suprapubic catheter showed a vast improvement, with 2 WBC/hpf and rare bacteria.  WBC count decreased.  She had lower extremity edema so her daughter felt furosemide would help.  Mrs. Summers opted to be DNR so that her chronic pain could be treated despite her hypotension.  She had transient atrial fibrillation after her first dose of furosemide.  She diuresed well, chest pain resolved, and leg edema improved.  On 6/30/17 she reported that she had not had a bowel movement since 6/21/17 and had not stood up since 6/27/17.  She was given bisacodyl and lactulose and subsequently had relief of obstipation.  She had difficulty standing because she must go directly from supine to standing, but was able to do it once.  Home hospice was set up with [facility] on Friday 6/30/17 in preparation for discharge on 7/1/17.

## 2017-06-28 LAB
ANION GAP SERPL CALC-SCNC: 9 MMOL/L
ANISOCYTOSIS BLD QL SMEAR: SLIGHT
BACTERIA #/AREA URNS HPF: ABNORMAL /HPF
BASOPHILS # BLD AUTO: ABNORMAL K/UL
BASOPHILS NFR BLD: 0 %
BILIRUB UR QL STRIP: NEGATIVE
BUN SERPL-MCNC: 18 MG/DL
CALCIUM SERPL-MCNC: 8.4 MG/DL
CHLORIDE SERPL-SCNC: 97 MMOL/L
CLARITY UR: CLEAR
CO2 SERPL-SCNC: 21 MMOL/L
COLOR UR: YELLOW
CREAT SERPL-MCNC: 0.7 MG/DL
DIFFERENTIAL METHOD: ABNORMAL
EOSINOPHIL # BLD AUTO: ABNORMAL K/UL
EOSINOPHIL NFR BLD: 0 %
ERYTHROCYTE [DISTWIDTH] IN BLOOD BY AUTOMATED COUNT: 15.3 %
EST. GFR  (AFRICAN AMERICAN): >60 ML/MIN/1.73 M^2
EST. GFR  (NON AFRICAN AMERICAN): >60 ML/MIN/1.73 M^2
GLUCOSE SERPL-MCNC: 159 MG/DL
GLUCOSE UR QL STRIP: NEGATIVE
GRAN CASTS #/AREA URNS LPF: 1 /LPF
HCT VFR BLD AUTO: 25.3 %
HGB BLD-MCNC: 8.2 G/DL
HGB UR QL STRIP: ABNORMAL
HYALINE CASTS #/AREA URNS LPF: 1 /LPF
HYPOCHROMIA BLD QL SMEAR: ABNORMAL
KETONES UR QL STRIP: NEGATIVE
LEUKOCYTE ESTERASE UR QL STRIP: NEGATIVE
LYMPHOCYTES # BLD AUTO: ABNORMAL K/UL
LYMPHOCYTES NFR BLD: 1 %
MAGNESIUM SERPL-MCNC: 2 MG/DL
MCH RBC QN AUTO: 27.7 PG
MCHC RBC AUTO-ENTMCNC: 32.4 %
MCV RBC AUTO: 86 FL
MICROSCOPIC COMMENT: ABNORMAL
MONOCYTES # BLD AUTO: ABNORMAL K/UL
MONOCYTES NFR BLD: 4 %
MYELOCYTES NFR BLD MANUAL: 1 %
NEUTROPHILS NFR BLD: 83 %
NEUTS BAND NFR BLD MANUAL: 11 %
NITRITE UR QL STRIP: NEGATIVE
PH UR STRIP: 6 [PH] (ref 5–8)
PHOSPHATE SERPL-MCNC: 2.7 MG/DL
PLATELET # BLD AUTO: 269 K/UL
PLATELET BLD QL SMEAR: ABNORMAL
PMV BLD AUTO: 8.7 FL
POTASSIUM SERPL-SCNC: 3.5 MMOL/L
PROT UR QL STRIP: ABNORMAL
RBC # BLD AUTO: 2.96 M/UL
RBC #/AREA URNS HPF: 5 /HPF (ref 0–4)
SODIUM SERPL-SCNC: 127 MMOL/L
SP GR UR STRIP: 1.01 (ref 1–1.03)
URN SPEC COLLECT METH UR: ABNORMAL
UROBILINOGEN UR STRIP-ACNC: 1 EU/DL
WBC # BLD AUTO: 21.91 K/UL
WBC #/AREA URNS HPF: 2 /HPF (ref 0–5)

## 2017-06-28 PROCEDURE — 80048 BASIC METABOLIC PNL TOTAL CA: CPT

## 2017-06-28 PROCEDURE — 84100 ASSAY OF PHOSPHORUS: CPT

## 2017-06-28 PROCEDURE — 85007 BL SMEAR W/DIFF WBC COUNT: CPT

## 2017-06-28 PROCEDURE — 94761 N-INVAS EAR/PLS OXIMETRY MLT: CPT

## 2017-06-28 PROCEDURE — 25000003 PHARM REV CODE 250: Performed by: HOSPITALIST

## 2017-06-28 PROCEDURE — 63600175 PHARM REV CODE 636 W HCPCS: Performed by: HOSPITALIST

## 2017-06-28 PROCEDURE — 83735 ASSAY OF MAGNESIUM: CPT

## 2017-06-28 PROCEDURE — 25000242 PHARM REV CODE 250 ALT 637 W/ HCPCS: Performed by: HOSPITALIST

## 2017-06-28 PROCEDURE — 94640 AIRWAY INHALATION TREATMENT: CPT

## 2017-06-28 PROCEDURE — 36415 COLL VENOUS BLD VENIPUNCTURE: CPT

## 2017-06-28 PROCEDURE — 99900035 HC TECH TIME PER 15 MIN (STAT)

## 2017-06-28 PROCEDURE — 99232 SBSQ HOSP IP/OBS MODERATE 35: CPT | Mod: ,,, | Performed by: UROLOGY

## 2017-06-28 PROCEDURE — 85027 COMPLETE CBC AUTOMATED: CPT

## 2017-06-28 PROCEDURE — 94660 CPAP INITIATION&MGMT: CPT

## 2017-06-28 PROCEDURE — 11000001 HC ACUTE MED/SURG PRIVATE ROOM

## 2017-06-28 RX ORDER — FUROSEMIDE 10 MG/ML
20 INJECTION INTRAMUSCULAR; INTRAVENOUS ONCE
Status: COMPLETED | OUTPATIENT
Start: 2017-06-28 | End: 2017-06-28

## 2017-06-28 RX ORDER — DOXYCYCLINE HYCLATE 100 MG
100 TABLET ORAL EVERY 12 HOURS
Status: DISCONTINUED | OUTPATIENT
Start: 2017-06-28 | End: 2017-07-01 | Stop reason: HOSPADM

## 2017-06-28 RX ORDER — FUROSEMIDE 10 MG/ML
20 INJECTION, SOLUTION INTRAMUSCULAR; INTRAVENOUS ONCE
Status: DISCONTINUED | OUTPATIENT
Start: 2017-06-28 | End: 2017-06-28 | Stop reason: RX

## 2017-06-28 RX ORDER — MORPHINE SULFATE 2 MG/ML
2 INJECTION, SOLUTION INTRAMUSCULAR; INTRAVENOUS EVERY 6 HOURS PRN
Status: DISCONTINUED | OUTPATIENT
Start: 2017-06-28 | End: 2017-07-01 | Stop reason: HOSPADM

## 2017-06-28 RX ORDER — ALBUTEROL SULFATE 0.83 MG/ML
2.5 SOLUTION RESPIRATORY (INHALATION) EVERY 4 HOURS PRN
Status: DISCONTINUED | OUTPATIENT
Start: 2017-06-28 | End: 2017-07-01 | Stop reason: HOSPADM

## 2017-06-28 RX ADMIN — DOCUSATE SODIUM 100 MG: 100 CAPSULE, LIQUID FILLED ORAL at 09:06

## 2017-06-28 RX ADMIN — CEFEPIME HYDROCHLORIDE 1 G: 1 INJECTION, SOLUTION INTRAVENOUS at 09:06

## 2017-06-28 RX ADMIN — ALBUTEROL SULFATE 2.5 MG: 2.5 SOLUTION RESPIRATORY (INHALATION) at 11:06

## 2017-06-28 RX ADMIN — OXYCODONE HYDROCHLORIDE 15 MG: 5 TABLET ORAL at 07:06

## 2017-06-28 RX ADMIN — LUBIPROSTONE 8 MCG: 8 CAPSULE, GELATIN COATED ORAL at 05:06

## 2017-06-28 RX ADMIN — ALBUTEROL SULFATE 2.5 MG: 2.5 SOLUTION RESPIRATORY (INHALATION) at 07:06

## 2017-06-28 RX ADMIN — ALBUTEROL SULFATE 2.5 MG: 2.5 SOLUTION RESPIRATORY (INHALATION) at 04:06

## 2017-06-28 RX ADMIN — DOXYCYCLINE HYCLATE 100 MG: 100 TABLET, COATED ORAL at 09:06

## 2017-06-28 RX ADMIN — FUROSEMIDE 20 MG: 10 INJECTION, SOLUTION INTRAMUSCULAR; INTRAVENOUS at 11:06

## 2017-06-28 RX ADMIN — IBUPROFEN 800 MG: 400 TABLET, FILM COATED ORAL at 05:06

## 2017-06-28 RX ADMIN — OXYCODONE HYDROCHLORIDE 10 MG: 5 TABLET ORAL at 09:06

## 2017-06-28 RX ADMIN — OXYCODONE HYDROCHLORIDE 15 MG: 5 TABLET ORAL at 02:06

## 2017-06-28 RX ADMIN — POLYETHYLENE GLYCOL 3350 17 G: 17 POWDER, FOR SOLUTION ORAL at 09:06

## 2017-06-28 RX ADMIN — MORPHINE SULFATE 1 MG: 2 INJECTION, SOLUTION INTRAMUSCULAR; INTRAVENOUS at 01:06

## 2017-06-28 RX ADMIN — LEVOTHYROXINE SODIUM 50 MCG: 50 TABLET ORAL at 05:06

## 2017-06-28 RX ADMIN — CEFEPIME HYDROCHLORIDE 1 G: 1 INJECTION, SOLUTION INTRAVENOUS at 06:06

## 2017-06-28 RX ADMIN — FUROSEMIDE 20 MG: 10 INJECTION, SOLUTION INTRAMUSCULAR; INTRAVENOUS at 06:06

## 2017-06-28 NOTE — PROGRESS NOTES
.Pharmacy New Medication Education    Patient accepted medication education.    Pharmacy educated patient on the following medications, using the teach-back method.   Albuterol  doxycycline  Learners of pharmacy medication education included:  patient    Patient +/- learner response:  verbalize understanding

## 2017-06-28 NOTE — ASSESSMENT & PLAN NOTE
Acute cystitis without hematuria  Giving cefepime and change vancomycin to doxycycline.  Finish 7 days of antibiotics.  WBC count decreasing.

## 2017-06-28 NOTE — PT/OT/SLP PROGRESS
Occupational Therapy      La Summers  MRN: [id number]    Patient not seen today secondary to 7/10 chest pain.  Family reports Pt. Has stage IV cancer & sees no need for OT services.      Elda Arteaga, OT  6/27/2017

## 2017-06-28 NOTE — PROGRESS NOTES
Dr. Mccarthy notified of patient's family and patient wishes to change code status to DNR as well as seeing about getting the patient a diet after procedure. Dr. Dela Cruz states will place orders and come speak with the patient and family about code status.

## 2017-06-28 NOTE — PROGRESS NOTES
Checked patient's manual BP per nurse request. Manual BP 95/50. Patient denies dizziness or light headedness. Reported BP to ARIANNA Hicks.

## 2017-06-28 NOTE — SUBJECTIVE & OBJECTIVE
Interval History: Chest pain not as bad as yesterday.    Review of Systems   Respiratory: Positive for shortness of breath. Negative for cough.    Cardiovascular: Positive for chest pain. Negative for palpitations.   Genitourinary: Positive for pelvic pain and vaginal pain.     Objective:     Vital Signs (Most Recent):  Temp: 97.6 °F (36.4 °C) (06/28/17 0800)  Pulse: 99 (06/28/17 0800)  Resp: 20 (06/28/17 0800)  BP: (!) 86/40 (06/28/17 0800)  SpO2: (!) 93 % (06/28/17 0737) Vital Signs (24h Range):  Temp:  [97.4 °F (36.3 °C)-98.9 °F (37.2 °C)] 97.6 °F (36.4 °C)  Pulse:  [] 99  Resp:  [15-28] 20  SpO2:  [91 %-100 %] 93 %  BP: ()/(40-65) 86/40     Weight: 106.1 kg (234 lb)  Body mass index is 45.7 kg/m².    Intake/Output Summary (Last 24 hours) at 06/28/17 1011  Last data filed at 06/28/17 0635   Gross per 24 hour   Intake              425 ml   Output              595 ml   Net             -170 ml      Physical Exam   Constitutional: She is oriented to person, place, and time. She appears well-developed and well-nourished.   Pulmonary/Chest: Effort normal. She has no rhonchi. She has no rales.   Abdominal: Soft. Bowel sounds are normal. There is no tenderness.   Genitourinary:   Genitourinary Comments: Suprapubic catheter   Neurological: She is alert and oriented to person, place, and time.   Skin: Skin is warm and dry.   Psychiatric: She has a normal mood and affect. Her behavior is normal.   Nursing note and vitals reviewed.      Significant Labs:   CBC:     Recent Labs  Lab 06/27/17  0501 06/28/17  0555   WBC 27.77* 21.91*   HGB 8.6* 8.2*   HCT 26.1* 25.3*    269     CMP:     Recent Labs  Lab 06/27/17  0501 06/28/17  0555   * 127*   K 3.3* 3.5   CL 99 97   CO2 21* 21*    159*   BUN 14 18   CREATININE 0.8 0.7   CALCIUM 8.7 8.4*   ANIONGAP 8 9   EGFRNONAA >60 >60     Magnesium:     Recent Labs  Lab 06/27/17  0501 06/28/17  0555   MG 2.2 2.0       Significant Imaging: I have reviewed all  pertinent imaging results/findings within the past 24 hours.  None new

## 2017-06-28 NOTE — PROGRESS NOTES
"MET called at 1338. Patient complaints of chest pain with st elevation on telemetry monitor. Dr. Mccarthy at patient bedside at 1340 Verbal order given to administer ASA and EKG. At  1344 Verbal Order given for nebulizer treatment. Patient takes 324 mg ASA and nebulizer administered at 1348. When asked patient about being a Full Code or DNR Patient verbally stated "i want everything done",. MET ended at 1350. Patient expressed no worsening in chest pain at this time with miniimal improvement in pain rating it 6 out of 10.  "

## 2017-06-28 NOTE — PT/OT/SLP PROGRESS
Physical Therapy  Discharge    La Summers  MRN: [id number]  Time 1428    Patient and family refused PT service at this time. Will DC PT service per patient and family request.    Savage Alvarado, PT

## 2017-06-28 NOTE — ASSESSMENT & PLAN NOTE
Mechanical obstruction of urinary tract  On palliative care for her cancer.  Followed by Dr. Dougie Sanchez.  Appreciate Urology and IR.  Suprapubic catheter placed.  DNR.

## 2017-06-28 NOTE — PROGRESS NOTES
[facility] Hospital Medicine  Progress Note    Patient Name: La Summers  MRN: [id number]  Patient Class: IP- Inpatient   Admission Date: 6/24/2017  Length of Stay: 3 days  Attending Physician: Parviz Mccarthy MD  Primary Care Provider: Suzette Mccray MD        Subjective:     Principal Problem:Sepsis secondary to UTI    HPI:  La Summers is a 72 y.o. 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  She lives in [place], [place] with her  and son.  Her daughter Alexandria is a nurse.  Her primary care physician is Dr. Mary Ann Mccray.  Her gynecologic oncologist is Dr. Dougie Sanchez.      Dr. Sanchez prescribed her ciprofloxacin for a UTI on 6/23/17.  She had fever of 101.7 F at the time.  She went to [facility] ED on 6/24/17 with shortness of breath that began in the afternoon, with mucous congestion.  She had poor appetite and felt too weak to stand.  Due to mechanical obstruction by her cancer, she must stand to urinate.  Dr. Sanchez had referred to her to urologist Dr. Savage Quinonez to address this worsening problem.  She had bilateral leg edema.  She was not hypoxic.  She was tachypnic (respiratory rate 22) and put on nasal cannula.  Blood pressure was 109/56.  Labs showed leukocytosis (WBC 22,840), anemia (hemoglobin 8.4, hematocrit 26.8), hypokalemia (3.1), acute kidney injury (creatinine 1.2 from 0.8 on 9/13/16), hypoalbuminemia (2.0), elevated BNP (298).  Urinalysis had >100  WBC/hpf, moderate WBC clumps, and moderate bacteria.  Chest CTA showed no pulmonary embolism, but showed nonspecific dilation of the left pulmonary artery, and mosaic pattern of the pulmonary parenchyma suggesting reactive airway disease or chronic thromboembolic disease.  She denied history of ever having asthma, COPD, or pulmonary embolism.  She was given IV morphine, ondansetron, and normal saline, and admitted to [facility].  She was also found to have severe hypomagnesemia (1.0).    Hospital Course:  Echocardiogram showed moderate to severe aortic stenosis (GEORGE = 1.22 cm2, peak velocity = 3.88 m/s, mean gradient = 41 mmHg), diastolic dysfunction, and pulmonary hypertension.  She was given cefepime and vancomycin.  Urine culture had no growth but WBC count increased.  Renal function improved to normal.  She was given magnesium until level was normal.  Her case was discussed with Dr. Sanchez, who requested that Urology see her.  Urologist Dr. Dariel Singh advised suprapubic catheter placement by Interventional Radiology to avoid a large open incision, and it was done by radiologist Dr. Rinku Laws.  After the procedure she had chest pain and shortness of breath, with wheeze on exam.  Oxygen saturation was 98% on room air.  EKG showed right bundle branch block.  Troponin was normal.  Chest x-ray was unremarkable as it was on admission.  She was treated with albuterol nebulizer treatments, which improved her wheeze.  Repeat urinalysis from the suprapubic catheter showed a vast improvement, with 2 WBC/hpf and rare bacteria.  WBC count decreased.  She had lower extremity edema so her daughter felt furosemide would help.  Mrs. Summers opted to be DNR so that her chronic pain could be treated despite her hypotension.      Interval History: Chest pain not as bad as yesterday.    Review of Systems   Respiratory: Positive for shortness of breath. Negative for cough.    Cardiovascular: Positive for  chest pain. Negative for palpitations.   Genitourinary: Positive for pelvic pain and vaginal pain.     Objective:     Vital Signs (Most Recent):  Temp: 97.6 °F (36.4 °C) (06/28/17 0800)  Pulse: 99 (06/28/17 0800)  Resp: 20 (06/28/17 0800)  BP: (!) 86/40 (06/28/17 0800)  SpO2: (!) 93 % (06/28/17 0737) Vital Signs (24h Range):  Temp:  [97.4 °F (36.3 °C)-98.9 °F (37.2 °C)] 97.6 °F (36.4 °C)  Pulse:  [] 99  Resp:  [15-28] 20  SpO2:  [91 %-100 %] 93 %  BP: ()/(40-65) 86/40     Weight: 106.1 kg (234 lb)  Body mass index is 45.7 kg/m².    Intake/Output Summary (Last 24 hours) at 06/28/17 1011  Last data filed at 06/28/17 0635   Gross per 24 hour   Intake              425 ml   Output              595 ml   Net             -170 ml      Physical Exam   Constitutional: She is oriented to person, place, and time. She appears well-developed and well-nourished.   Pulmonary/Chest: Effort normal. She has no rhonchi. She has no rales.   Abdominal: Soft. Bowel sounds are normal. There is no tenderness.   Genitourinary:   Genitourinary Comments: Suprapubic catheter   Neurological: She is alert and oriented to person, place, and time.   Skin: Skin is warm and dry.   Psychiatric: She has a normal mood and affect. Her behavior is normal.   Nursing note and vitals reviewed.      Significant Labs:   CBC:     Recent Labs  Lab 06/27/17  0501 06/28/17  0555   WBC 27.77* 21.91*   HGB 8.6* 8.2*   HCT 26.1* 25.3*    269     CMP:     Recent Labs  Lab 06/27/17  0501 06/28/17  0555   * 127*   K 3.3* 3.5   CL 99 97   CO2 21* 21*    159*   BUN 14 18   CREATININE 0.8 0.7   CALCIUM 8.7 8.4*   ANIONGAP 8 9   EGFRNONAA >60 >60     Magnesium:     Recent Labs  Lab 06/27/17  0501 06/28/17  0555   MG 2.2 2.0       Significant Imaging: I have reviewed all pertinent imaging results/findings within the past 24 hours.  None new    Assessment/Plan:      * Sepsis secondary to UTI    Acute cystitis without hematuria  Giving cefepime  and change vancomycin to doxycycline.  Finish 7 days of antibiotics.  WBC count decreasing.        Shortness of breath    Treating as reactive airway disease.  Also try furosemide for aortic stenosis and edema.          Aortic stenosis    Try furosemide for edema to see if chest pain improves.        Chronic neoplasm-related pain    Does not want to take morphine elixir.  Changed to oxycodone 10-15 mg prn with acetaminophen and ibuprofen.           Therapeutic opioid induced constipation    Takes lubiprostone 8 mcg BID and polyethylene glycol.  Continue.          Essential hypertension    Holding triamterene-hydrochlorothiazide 37.5-25 mg daily.          Acquired hypothyroidism    Continue levothyroxine 50 mcg daily.          Obstructive sleep apnea on CPAP    Continue CPAP nightly.          Vulvar cancer    Mechanical obstruction of urinary tract  On palliative care for her cancer.  Followed by Dr. Dougie Sanchez.  Appreciate Urology and IR.  Suprapubic catheter placed.  DNR.            VTE Risk Mitigation         Ordered     Medium Risk of VTE  Once      06/25/17 0348     Place MIRIAM hose  Until discontinued      06/25/17 0348     Place sequential compression device  Until discontinued      06/25/17 0348          Parviz Mccarthy MD  Department of Hospital Medicine   [facility]

## 2017-06-28 NOTE — PLAN OF CARE
Problem: Patient Care Overview  Goal: Plan of Care Review  Outcome: Ongoing (interventions implemented as appropriate)  Pt on RA with sats of 93%. Will continue to monitor.

## 2017-06-28 NOTE — PROGRESS NOTES
TN met with  pt's daughter  Alexandria  [phone]     daughter stated she had planned to set up hospice when pt is d/c'd to home    TN informed Ms. Ibrahim that TN is available to assist with hospice   no pref for hospice provider   TN contacted Reyna with [facility] - she will contact daughter p #  [phone]

## 2017-06-29 PROBLEM — I50.31 ACUTE DIASTOLIC HEART FAILURE: Status: ACTIVE | Noted: 2017-06-25

## 2017-06-29 PROBLEM — R06.02 SHORTNESS OF BREATH: Status: RESOLVED | Noted: 2017-06-24 | Resolved: 2017-06-29

## 2017-06-29 LAB
ANION GAP SERPL CALC-SCNC: 8 MMOL/L
ANISOCYTOSIS BLD QL SMEAR: SLIGHT
BACTERIA UR CULT: NO GROWTH
BASOPHILS # BLD AUTO: 0.03 K/UL
BASOPHILS NFR BLD: 0.1 %
BUN SERPL-MCNC: 18 MG/DL
CALCIUM SERPL-MCNC: 8.7 MG/DL
CHLORIDE SERPL-SCNC: 96 MMOL/L
CO2 SERPL-SCNC: 28 MMOL/L
CREAT SERPL-MCNC: 0.7 MG/DL
DIFFERENTIAL METHOD: ABNORMAL
EOSINOPHIL # BLD AUTO: 0.3 K/UL
EOSINOPHIL NFR BLD: 1.5 %
ERYTHROCYTE [DISTWIDTH] IN BLOOD BY AUTOMATED COUNT: 15.1 %
EST. GFR  (AFRICAN AMERICAN): >60 ML/MIN/1.73 M^2
EST. GFR  (NON AFRICAN AMERICAN): >60 ML/MIN/1.73 M^2
GLUCOSE SERPL-MCNC: 142 MG/DL
HCT VFR BLD AUTO: 27 %
HGB BLD-MCNC: 8.7 G/DL
HYPOCHROMIA BLD QL SMEAR: ABNORMAL
LYMPHOCYTES # BLD AUTO: 0.8 K/UL
LYMPHOCYTES NFR BLD: 4.1 %
MAGNESIUM SERPL-MCNC: 1.7 MG/DL
MCH RBC QN AUTO: 27.4 PG
MCHC RBC AUTO-ENTMCNC: 32.2 %
MCV RBC AUTO: 85 FL
MONOCYTES # BLD AUTO: 2 K/UL
MONOCYTES NFR BLD: 10 %
NEUTROPHILS # BLD AUTO: 16.5 K/UL
NEUTROPHILS NFR BLD: 82.2 %
OVALOCYTES BLD QL SMEAR: ABNORMAL
PHOSPHATE SERPL-MCNC: 3.2 MG/DL
PLATELET # BLD AUTO: 277 K/UL
PLATELET BLD QL SMEAR: ABNORMAL
PMV BLD AUTO: 8.6 FL
POLYCHROMASIA BLD QL SMEAR: ABNORMAL
POTASSIUM SERPL-SCNC: 3.2 MMOL/L
RBC # BLD AUTO: 3.18 M/UL
SODIUM SERPL-SCNC: 132 MMOL/L
WBC # BLD AUTO: 20.11 K/UL

## 2017-06-29 PROCEDURE — 85007 BL SMEAR W/DIFF WBC COUNT: CPT

## 2017-06-29 PROCEDURE — 25000242 PHARM REV CODE 250 ALT 637 W/ HCPCS: Performed by: HOSPITALIST

## 2017-06-29 PROCEDURE — 25000003 PHARM REV CODE 250: Performed by: HOSPITALIST

## 2017-06-29 PROCEDURE — 36415 COLL VENOUS BLD VENIPUNCTURE: CPT

## 2017-06-29 PROCEDURE — 94761 N-INVAS EAR/PLS OXIMETRY MLT: CPT

## 2017-06-29 PROCEDURE — 84100 ASSAY OF PHOSPHORUS: CPT

## 2017-06-29 PROCEDURE — 94640 AIRWAY INHALATION TREATMENT: CPT

## 2017-06-29 PROCEDURE — 27000221 HC OXYGEN, UP TO 24 HOURS

## 2017-06-29 PROCEDURE — 83735 ASSAY OF MAGNESIUM: CPT

## 2017-06-29 PROCEDURE — 11000001 HC ACUTE MED/SURG PRIVATE ROOM

## 2017-06-29 PROCEDURE — 63600175 PHARM REV CODE 636 W HCPCS: Performed by: HOSPITALIST

## 2017-06-29 PROCEDURE — 80048 BASIC METABOLIC PNL TOTAL CA: CPT

## 2017-06-29 PROCEDURE — 85027 COMPLETE CBC AUTOMATED: CPT

## 2017-06-29 RX ORDER — CEFPODOXIME PROXETIL 200 MG/1
200 TABLET, FILM COATED ORAL EVERY 12 HOURS
Status: DISCONTINUED | OUTPATIENT
Start: 2017-06-29 | End: 2017-06-30

## 2017-06-29 RX ORDER — FUROSEMIDE 10 MG/ML
20 INJECTION INTRAMUSCULAR; INTRAVENOUS 2 TIMES DAILY
Status: DISCONTINUED | OUTPATIENT
Start: 2017-06-29 | End: 2017-06-30

## 2017-06-29 RX ORDER — POTASSIUM CHLORIDE 20 MEQ/1
40 TABLET, EXTENDED RELEASE ORAL ONCE
Status: COMPLETED | OUTPATIENT
Start: 2017-06-29 | End: 2017-06-29

## 2017-06-29 RX ORDER — POTASSIUM CHLORIDE 20 MEQ/1
40 TABLET, EXTENDED RELEASE ORAL
Status: DISCONTINUED | OUTPATIENT
Start: 2017-06-29 | End: 2017-07-01 | Stop reason: HOSPADM

## 2017-06-29 RX ORDER — LANOLIN ALCOHOL/MO/W.PET/CERES
400 CREAM (GRAM) TOPICAL
Status: COMPLETED | OUTPATIENT
Start: 2017-06-29 | End: 2017-06-30

## 2017-06-29 RX ADMIN — DOCUSATE SODIUM 100 MG: 100 CAPSULE, LIQUID FILLED ORAL at 10:06

## 2017-06-29 RX ADMIN — DOXYCYCLINE HYCLATE 100 MG: 100 TABLET, COATED ORAL at 10:06

## 2017-06-29 RX ADMIN — LEVOTHYROXINE SODIUM 50 MCG: 50 TABLET ORAL at 05:06

## 2017-06-29 RX ADMIN — LUBIPROSTONE 8 MCG: 8 CAPSULE, GELATIN COATED ORAL at 06:06

## 2017-06-29 RX ADMIN — POTASSIUM CHLORIDE 40 MEQ: 20 TABLET, EXTENDED RELEASE ORAL at 06:06

## 2017-06-29 RX ADMIN — FUROSEMIDE 20 MG: 10 INJECTION, SOLUTION INTRAMUSCULAR; INTRAVENOUS at 08:06

## 2017-06-29 RX ADMIN — DOXYCYCLINE HYCLATE 100 MG: 100 TABLET, COATED ORAL at 08:06

## 2017-06-29 RX ADMIN — ALBUTEROL SULFATE 2.5 MG: 2.5 SOLUTION RESPIRATORY (INHALATION) at 11:06

## 2017-06-29 RX ADMIN — OXYCODONE HYDROCHLORIDE 15 MG: 5 TABLET ORAL at 08:06

## 2017-06-29 RX ADMIN — FLUTICASONE PROPIONATE 2 SPRAY: 50 SPRAY, METERED NASAL at 08:06

## 2017-06-29 RX ADMIN — POTASSIUM CHLORIDE 40 MEQ: 20 TABLET, EXTENDED RELEASE ORAL at 12:06

## 2017-06-29 RX ADMIN — CEFEPIME HYDROCHLORIDE 1 G: 1 INJECTION, SOLUTION INTRAVENOUS at 08:06

## 2017-06-29 RX ADMIN — CEFPODOXIME PROXETIL 200 MG: 200 TABLET, FILM COATED ORAL at 10:06

## 2017-06-29 RX ADMIN — POLYETHYLENE GLYCOL 3350 17 G: 17 POWDER, FOR SOLUTION ORAL at 10:06

## 2017-06-29 RX ADMIN — OXYCODONE HYDROCHLORIDE 15 MG: 5 TABLET ORAL at 02:06

## 2017-06-29 RX ADMIN — FUROSEMIDE 20 MG: 10 INJECTION, SOLUTION INTRAMUSCULAR; INTRAVENOUS at 06:06

## 2017-06-29 RX ADMIN — MORPHINE SULFATE 2 MG: 2 INJECTION, SOLUTION INTRAMUSCULAR; INTRAVENOUS at 12:06

## 2017-06-29 RX ADMIN — MAGNESIUM OXIDE TAB 400 MG (241.3 MG ELEMENTAL MG) 400 MG: 400 (241.3 MG) TAB at 06:06

## 2017-06-29 RX ADMIN — MAGNESIUM OXIDE TAB 400 MG (241.3 MG ELEMENTAL MG) 400 MG: 400 (241.3 MG) TAB at 12:06

## 2017-06-29 RX ADMIN — DOCUSATE SODIUM 100 MG: 100 CAPSULE, LIQUID FILLED ORAL at 08:06

## 2017-06-29 RX ADMIN — OXYCODONE HYDROCHLORIDE 15 MG: 5 TABLET ORAL at 03:06

## 2017-06-29 RX ADMIN — POLYETHYLENE GLYCOL 3350 17 G: 17 POWDER, FOR SOLUTION ORAL at 08:06

## 2017-06-29 RX ADMIN — ALBUTEROL SULFATE 2.5 MG: 2.5 SOLUTION RESPIRATORY (INHALATION) at 05:06

## 2017-06-29 RX ADMIN — LUBIPROSTONE 8 MCG: 8 CAPSULE, GELATIN COATED ORAL at 08:06

## 2017-06-29 RX ADMIN — MORPHINE SULFATE 2 MG: 2 INJECTION, SOLUTION INTRAMUSCULAR; INTRAVENOUS at 05:06

## 2017-06-29 NOTE — NURSING
Pt daughter has multiple concerns she wished to ask dr simran khalil contacted .  Dr khalil in room to speak to family and pt

## 2017-06-29 NOTE — PLAN OF CARE
Problem: Patient Care Overview  Goal: Plan of Care Review  Outcome: Ongoing (interventions implemented as appropriate)  Pt on RA with sats of 95%. Will continue to monitor.

## 2017-06-29 NOTE — CHAPLAIN
Transitional Navigator and Nurse asked me to visit this patient.  Pritesh was at bedside during visit.  Patient was very groggy but seemed pleased that the  came to visit.  She seemed to doze off and awaken during the visit.  She did not say much other than that she has cancer. I sat at her bedside in attendance and pritesh caught me up on her situation and progress.  She had many visitors yesterday and is tired.  She relies on her hugo to cope. She was anointed by the  2 days ago.  I suggested that I pray with them and then allow her to rest.  I provide prayer and patient prayed with me. I suggested that they call for me when she can better tolerate a visit.

## 2017-06-29 NOTE — PROGRESS NOTES
[facility]  Urology  Progress Note    Patient Name: La Summers  MRN: [id number]  Admission Date: 6/24/2017  Hospital Length of Stay: 4 days  Code Status: DNR   Attending Provider: Parviz Mccarthy MD   Primary Care Physician: Suzette Mccray MD    Subjective:     HPI:  Patient is a 71 yo female with a h/o advanced vulvar cancer.  History is obtained from her daughter who is a nurse.  She reports a h/o endometrial cancer and is s/p hysterectomy in the past.  She also states she has had a cholecystectomy and appendectomy at some point she thinks but the details she is unsure of.  He presented to the hospital with fevers and sepsis secondary to a urinary tract infection.  Her white blood cell count has been significantly elevated.  Over the course of the past months, the patient has had increasing difficulty with urination.  She has had recurrence multiple times of vulvar cancer.  She is status post radiation and radical excision in the past.  She is DO NOT RESUSCITATE but her quality of life has diminished significantly due to pain with urination and difficulty with urination.  Her daughter describes having to manually retract her labia and inner thighs as well as  order to empty her bladder.    She has no abdominal imaging for review.  Her serum creatinine is within normal limits.    Interval History: Patient had some chest pain and interventional radiology but overnight did well.  Catheter is draining well.    Review of Systems  Objective:     Temp:  [98.7 °F (37.1 °C)] 98.7 °F (37.1 °C)  Pulse:  [] 87  Resp:  [17-18] 17  SpO2:  [97 %-99 %] 99 %  BP: ()/(50-52) 103/52     Body mass index is 45.7 kg/m².            Drains     Drain                 Closed/Suction Drain 02/19/15 1554 Left Thigh Bulb 15 Fr. 860 days         Closed/Suction Drain 02/19/15 1603 Left Perineal Bulb 15 Fr. 860 days         Suprapubic Catheter 06/27/17 1208 other (see comments) 8 Fr. 1 day                 Physical Exam   Constitutional: She is oriented to person, place, and time. She appears well-developed and well-nourished. No distress.   HENT:   Head: Normocephalic and atraumatic.   Eyes: No scleral icterus.   Neck: No tracheal deviation present.   Pulmonary/Chest: Effort normal. No respiratory distress.   Receiving a breathing treatment currently   Abdominal:       Neurological: She is alert and oriented to person, place, and time.   Psychiatric: She has a normal mood and affect. Her behavior is normal. Judgment and thought content normal.       Significant Labs:    BMP:    Recent Labs  Lab 06/26/17  0556 06/27/17  0501 06/28/17  0555   * 128* 127*   K 3.6 3.3* 3.5   CL 97 99 97   CO2 24 21* 21*   BUN 19 14 18   CREATININE 1.0 0.8 0.7   CALCIUM 8.0* 8.7 8.4*       CBC:     Recent Labs  Lab 06/26/17  0556 06/27/17  0501 06/28/17  0555   WBC 25.90* 27.77* 21.91*   HGB 7.8* 8.6* 8.2*   HCT 24.1* 26.1* 25.3*    253 269       Urine Culture:   Recent Labs  Lab 06/25/17  0147   LABURIN No significant growth       Significant Imaging:  All pertinent imaging results/findings from the past 24 hours have been reviewed.                  Assessment/Plan:     Chronic neoplasm-related pain    -Pain management per primary team          Vulvar cancer    -Given the severe anatomical distortion the patient underwent IR placement of a suprapubic catheter  -Likely will warrant upsizing of the catheter in the near future.        * Sepsis secondary to UTI    -Antibiotics per primary team.  Urine culture reviewed and negative.  However, leukocytosis continues to increase.  -Agree with palliative diversion of urine likely with a suprapubic catheter.  As stated above, current catheter is 8 [place] and therefore may require upsizing by interventional radiology in the future.            VTE Risk Mitigation         Ordered     Medium Risk of VTE  Once      06/25/17 0348     Place MIRIAM hose  Until discontinued       06/25/17 0348     Place sequential compression device  Until discontinued      06/25/17 0348          Dariel Singh MD  Urology  [facility]

## 2017-06-29 NOTE — CONSULTS
[facility]  Adult Nutrition  Consult Note    SUMMARY     Recommendations    Recommendation/Intervention:  1. Rec add 2 gm sodium restriction to diet order   2. If po intake <50% of meals, consider add boost plus bid  3. RD to f/u with interview  Goals: Meet 85% EEN  Nutrition Goal Status: new       Continuum of Care Plan    Referral to Outpatient Services:  (D/C planning: low sodium diet with supplements as needed)    Reason for Assessment    Reason for Assessment: nurse/nurse practitioner consult  Diagnosis:  (sepsis)  Relevent Medical History: Ca; HTN; HF       General Information Comments: Patient out of room for procedure- unable to interview. Regular diet ordered- with nsg notes indicating 25% of meals consumed. Noted Stage I, weakness, constipation (associated with opiods); decreased appetite at home. Patient to d/c home with hospice (likely).  Will f/u with interview/assessment. Noted patient weight in September 2016 admission was 108 kg.    Nutrition Prescription Ordered    Current Diet Order: Regular     Evaluation of Received Nutrients/Fluid Intake       % Intake of Estimated Energy Needs: 25%  % Meal Intake: 25%    I/O: 50/2800    Nutrition/Diet History     Food Preferences: MARILEE   Factors Affecting Nutritional Intake: decreased appetite     Labs/Tests/Procedures/Meds     Pertinent Labs Reviewed: reviewed  Pertinent Labs Comments: K 3.2  Pertinent Medications Reviewed: reviewed  Pertinent Medications Comments: furosemide; abx    Physical Findings    Overall Physical Appearance: obese   Oral/Mouth Cavity: WDL  Skin: pressure ulcer(s) (stage I)    Anthropometrics    Temp: 100 °F (37.8 °C)     Height: 5' (152.4 cm)  Weight Method: Bed Scale  Weight: 106.1 kg (233 lb 14.5 oz)     Ideal Body Weight (IBW), Female: 100 lb     % Ideal Body Weight, Female (lb): 233.91 lb  BMI (Calculated): 45.8  BMI Grade: greater than 40 - morbid obesity     Estimated/Assessed Needs    Weight Used For  Calorie Calculations: 106.1 kg (233 lb 14.5 oz) (48 kg IBW)       Energy Need Method: Alva-St Jeor (9416-0735 kcal)     RMR (Alva-St. Jeor Equation): 1502.5      Weight Used For Protein Calculations: 106.1 kg (233 lb 14.5 oz) (IBW 48 kg)  Protein Requirements: 85 g     Fluid Need Method: RDA Method     Assessment and Plan    Nutrition Assessment  MARILEE at this time.      Monitor and Evaluation    Food and Nutrient Intake: energy intake, food and beverage intake  Food and Nutrient Adminstration: diet order   Anthropometric Measurements: weight, weight change   Nutrition-Focused Physical Findings: overall appearance    Nutrition Risk    Level of Risk:  (2 x week)    Nutrition Follow-Up    RD Follow-up?: Yes

## 2017-06-29 NOTE — PLAN OF CARE
Recommendations     Recommendation/Intervention:  1. Rec add 2 gm sodium restriction to diet order   2. If po intake <50% of meals, consider add boost plus bid  3. RD to f/u with interview  Goals: Meet 85% EEN  Nutrition Goal Status: new        Continuum of Care Plan     Referral to Outpatient Services:  (D/C planning: low sodium diet with supplements as needed)

## 2017-06-29 NOTE — SUBJECTIVE & OBJECTIVE
Interval History: Patient had some chest pain and interventional radiology but overnight did well.  Catheter is draining well.    Review of Systems  Objective:     Temp:  [98.7 °F (37.1 °C)] 98.7 °F (37.1 °C)  Pulse:  [] 87  Resp:  [17-18] 17  SpO2:  [97 %-99 %] 99 %  BP: ()/(50-52) 103/52     Body mass index is 45.7 kg/m².            Drains     Drain                 Closed/Suction Drain 02/19/15 1554 Left Thigh Bulb 15 Fr. 860 days         Closed/Suction Drain 02/19/15 1603 Left Perineal Bulb 15 Fr. 860 days         Suprapubic Catheter 06/27/17 1208 other (see comments) 8 Fr. 1 day                Physical Exam   Constitutional: She is oriented to person, place, and time. She appears well-developed and well-nourished. No distress.   HENT:   Head: Normocephalic and atraumatic.   Eyes: No scleral icterus.   Neck: No tracheal deviation present.   Pulmonary/Chest: Effort normal. No respiratory distress.   Receiving a breathing treatment currently   Abdominal:       Neurological: She is alert and oriented to person, place, and time.   Psychiatric: She has a normal mood and affect. Her behavior is normal. Judgment and thought content normal.       Significant Labs:    BMP:    Recent Labs  Lab 06/26/17  0556 06/27/17  0501 06/28/17  0555   * 128* 127*   K 3.6 3.3* 3.5   CL 97 99 97   CO2 24 21* 21*   BUN 19 14 18   CREATININE 1.0 0.8 0.7   CALCIUM 8.0* 8.7 8.4*       CBC:     Recent Labs  Lab 06/26/17  0556 06/27/17  0501 06/28/17  0555   WBC 25.90* 27.77* 21.91*   HGB 7.8* 8.6* 8.2*   HCT 24.1* 26.1* 25.3*    253 269       Urine Culture:   Recent Labs  Lab 06/25/17  0147   LABURIN No significant growth       Significant Imaging:  All pertinent imaging results/findings from the past 24 hours have been reviewed.

## 2017-06-29 NOTE — PLAN OF CARE
Problem: Patient Care Overview  Goal: Plan of Care Review  Outcome: Ongoing (interventions implemented as appropriate)  Pt is AAOx3 with complaints of pain in vulvur area.

## 2017-06-29 NOTE — ASSESSMENT & PLAN NOTE
-Antibiotics per primary team.  Urine culture reviewed and negative.  However, leukocytosis continues to increase.  -Agree with palliative diversion of urine likely with a suprapubic catheter.  As stated above, current catheter is 8 [place] and therefore may require upsizing by interventional radiology in the future.

## 2017-06-29 NOTE — PLAN OF CARE
Problem: Patient Care Overview  Goal: Plan of Care Review  Edema and pain to lower extremities much less by shift end  Pt able to engage comfortably in conversation with family towards end of shift   Pt and family refused a few scheduled meds as documented.     No further incontinence with bladder spasm as this morning   Safety maintained  Heart rhythm changed during shift   From sinus to afib and back again   Dr khalil informed   Denied any chest pain until late this afternoon after taking lubiprostone.  Pt stated gives gas pain  Stated did not feel like yesterday's discomfort and requested routine pain med for it   Pt repositioned per daughter throughout the day   mepilex dressing changed with am care      Pt had multiple visitors throughout the day.  Decision for dnr has been made and pt spending time with family members    Family requested vitals to be omitted during these intervals

## 2017-06-29 NOTE — PROGRESS NOTES
[facility] Hospital Medicine  Progress Note    Patient Name: La Summers  MRN: [id number]  Patient Class: IP- Inpatient   Admission Date: 6/24/2017  Length of Stay: 4 days  Attending Physician: Parviz Mccarthy MD  Primary Care Provider: Suzette Mccray MD        Subjective:     Principal Problem:Sepsis secondary to UTI    HPI:  La Summers is a 72 y.o. 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  She lives in [place], [place] with her  and son.  Her daughter Alexandria is a nurse.  Her primary care physician is Dr. Mary Ann Mccray.  Her gynecologic oncologist is Dr. Dougie Sanchez.      Dr. Sanchez prescribed her ciprofloxacin for a UTI on 6/23/17.  She had fever of 101.7 F at the time.  She went to [facility] ED on 6/24/17 with shortness of breath that began in the afternoon, with mucous congestion.  She had poor appetite and felt too weak to stand.  Due to mechanical obstruction by her cancer, she must stand to urinate.  Dr. Sanchez had referred to her to urologist Dr. Savage Quinonez to address this worsening problem.  She had bilateral leg edema.  She was not hypoxic.  She was tachypnic (respiratory rate 22) and put on nasal cannula.  Blood pressure was 109/56.  Labs showed leukocytosis (WBC 22,840), anemia (hemoglobin 8.4, hematocrit 26.8), hypokalemia (3.1), acute kidney injury (creatinine 1.2 from 0.8 on 9/13/16), hypoalbuminemia (2.0), elevated BNP (298).  Urinalysis had >100  WBC/hpf, moderate WBC clumps, and moderate bacteria.  Chest CTA showed no pulmonary embolism, but showed nonspecific dilation of the left pulmonary artery, and mosaic pattern of the pulmonary parenchyma suggesting reactive airway disease or chronic thromboembolic disease.  She denied history of ever having asthma, COPD, or pulmonary embolism.  She was given IV morphine, ondansetron, and normal saline, and admitted to [facility].  She was also found to have severe hypomagnesemia (1.0).    Hospital Course:  Echocardiogram showed moderate to severe aortic stenosis (GEORGE = 1.22 cm2, peak velocity = 3.88 m/s, mean gradient = 41 mmHg), diastolic dysfunction, and pulmonary hypertension.  She was given cefepime and vancomycin.  Urine culture had no growth but WBC count increased.  Renal function improved to normal.  She was given magnesium until level was normal.  Her case was discussed with Dr. Sanchez, who requested that Urology see her.  Urologist Dr. Dariel Singh advised suprapubic catheter placement by Interventional Radiology to avoid a large open incision, and it was done by radiologist Dr. Rinku Laws.  After the procedure she had chest pain and shortness of breath, with wheeze on exam.  Oxygen saturation was 98% on room air.  EKG showed right bundle branch block.  Troponin was normal.  Chest x-ray was unremarkable as it was on admission.  She was treated with albuterol nebulizer treatments, which improved her wheeze.  Repeat urinalysis from the suprapubic catheter showed a vast improvement, with 2 WBC/hpf and rare bacteria.  WBC count decreased.  She had lower extremity edema so her daughter felt furosemide would help.  Mrs. Summers opted to be DNR so that her chronic pain could be treated despite her hypotension.  She had transient atrial fibrillation after her first dose of furosemide.  She diuresed well, chest pain resolved, and leg edema improved.  Hyponatremia resolved confirming that she  was significantly hypervolemic before diuresis.      Interval History: Family visited yesterday, did not sleep much, feels tired.  Symptoms improving.    Review of Systems   Respiratory: Negative for cough and shortness of breath.    Cardiovascular: Negative for chest pain and palpitations.   Genitourinary: Positive for pelvic pain and vaginal pain.     Objective:     Vital Signs (Most Recent):  Temp: 98.6 °F (37 °C) (06/29/17 0800)  Pulse: 95 (06/29/17 0800)  Resp: 16 (06/29/17 0800)  BP: 125/68 (06/29/17 0800)  SpO2: 95 % (06/29/17 0800) Vital Signs (24h Range):  Temp:  [98.6 °F (37 °C)-98.7 °F (37.1 °C)] 98.6 °F (37 °C)  Pulse:  [] 95  Resp:  [16-18] 16  SpO2:  [95 %-99 %] 95 %  BP: (103-125)/(52-68) 125/68     Weight: 106.1 kg (234 lb)  Body mass index is 45.7 kg/m².    Intake/Output Summary (Last 24 hours) at 06/29/17 1044  Last data filed at 06/29/17 0600   Gross per 24 hour   Intake               50 ml   Output             2800 ml   Net            -2750 ml      Physical Exam   Constitutional: She is oriented to person, place, and time. She appears well-developed and well-nourished.   Pulmonary/Chest: Effort normal. She has no rhonchi. She has no rales.   Genitourinary:   Genitourinary Comments: Suprapubic catheter   Musculoskeletal: She exhibits edema.   Neurological: She is alert and oriented to person, place, and time.   Skin: Skin is warm and dry.   Psychiatric: She has a normal mood and affect. Her behavior is normal.   Nursing note and vitals reviewed.      Significant Labs:   CBC:     Recent Labs  Lab 06/28/17  0555 06/29/17  0812   WBC 21.91* 20.11*   HGB 8.2* 8.7*   HCT 25.3* 27.0*    277     CMP:     Recent Labs  Lab 06/28/17  0555 06/29/17  0812   * 132*   K 3.5 3.2*   CL 97 96   CO2 21* 28   * 142*   BUN 18 18   CREATININE 0.7 0.7   CALCIUM 8.4* 8.7   ANIONGAP 9 8   EGFRNONAA >60 >60     Magnesium:     Recent Labs  Lab 06/28/17  0555 06/29/17  0812   MG 2.0 1.7        Significant Imaging: I have reviewed all pertinent imaging results/findings within the past 24 hours.  None new    Assessment/Plan:      * Sepsis secondary to UTI    Acute cystitis without hematuria  Finish 7 days of antibiotics with cefpodoxime and doxycycline.  WBC count decreasing.        Acute diastolic heart failure    Continue IV furosemide.          Aortic stenosis    Contributes to heart failure.        Chronic neoplasm-related pain    Does not want to take morphine elixir.  Changed to oxycodone 10-15 mg prn with acetaminophen and ibuprofen.           Therapeutic opioid induced constipation    Takes lubiprostone 8 mcg BID and polyethylene glycol.  Continue.          Essential hypertension    Holding triamterene-hydrochlorothiazide 37.5-25 mg daily.          Acquired hypothyroidism    Continue levothyroxine 50 mcg daily.          Obstructive sleep apnea on CPAP    Continue CPAP nightly.          Vulvar cancer    Mechanical obstruction of urinary tract  On palliative care for her cancer.  Followed by Dr. Dougie Sanchez.  Appreciate Urology and IR.  Suprapubic catheter placed.  DNR.            VTE Risk Mitigation         Ordered     Medium Risk of VTE  Once      06/25/17 0348     Place MIRIAM hose  Until discontinued      06/25/17 0348     Place sequential compression device  Until discontinued      06/25/17 0348        Disposition: Discharge home with home hospice when feeling better, maybe tomorrow.    Parviz Mccarthy MD  Department of Hospital Medicine   [facility]

## 2017-06-29 NOTE — ASSESSMENT & PLAN NOTE
Acute cystitis without hematuria  Finish 7 days of antibiotics with cefpodoxime and doxycycline.  WBC count decreasing.

## 2017-06-29 NOTE — ASSESSMENT & PLAN NOTE
-Given the severe anatomical distortion the patient underwent IR placement of a suprapubic catheter  -Likely will warrant upsizing of the catheter in the near future.

## 2017-06-29 NOTE — SUBJECTIVE & OBJECTIVE
Interval History: Family visited yesterday, did not sleep much, feels tired.  Symptoms improving.    Review of Systems   Respiratory: Negative for cough and shortness of breath.    Cardiovascular: Negative for chest pain and palpitations.   Genitourinary: Positive for pelvic pain and vaginal pain.     Objective:     Vital Signs (Most Recent):  Temp: 98.6 °F (37 °C) (06/29/17 0800)  Pulse: 95 (06/29/17 0800)  Resp: 16 (06/29/17 0800)  BP: 125/68 (06/29/17 0800)  SpO2: 95 % (06/29/17 0800) Vital Signs (24h Range):  Temp:  [98.6 °F (37 °C)-98.7 °F (37.1 °C)] 98.6 °F (37 °C)  Pulse:  [] 95  Resp:  [16-18] 16  SpO2:  [95 %-99 %] 95 %  BP: (103-125)/(52-68) 125/68     Weight: 106.1 kg (234 lb)  Body mass index is 45.7 kg/m².    Intake/Output Summary (Last 24 hours) at 06/29/17 1044  Last data filed at 06/29/17 0600   Gross per 24 hour   Intake               50 ml   Output             2800 ml   Net            -2750 ml      Physical Exam   Constitutional: She is oriented to person, place, and time. She appears well-developed and well-nourished.   Pulmonary/Chest: Effort normal. She has no rhonchi. She has no rales.   Genitourinary:   Genitourinary Comments: Suprapubic catheter   Musculoskeletal: She exhibits edema.   Neurological: She is alert and oriented to person, place, and time.   Skin: Skin is warm and dry.   Psychiatric: She has a normal mood and affect. Her behavior is normal.   Nursing note and vitals reviewed.      Significant Labs:   CBC:     Recent Labs  Lab 06/28/17  0555 06/29/17  0812   WBC 21.91* 20.11*   HGB 8.2* 8.7*   HCT 25.3* 27.0*    277     CMP:     Recent Labs  Lab 06/28/17  0555 06/29/17  0812   * 132*   K 3.5 3.2*   CL 97 96   CO2 21* 28   * 142*   BUN 18 18   CREATININE 0.7 0.7   CALCIUM 8.4* 8.7   ANIONGAP 9 8   EGFRNONAA >60 >60     Magnesium:     Recent Labs  Lab 06/28/17  0555 06/29/17  0812   MG 2.0 1.7       Significant Imaging: I have reviewed all pertinent imaging  results/findings within the past 24 hours.  None new

## 2017-06-30 PROBLEM — K56.609: Chronic | Status: ACTIVE | Noted: 2017-06-24

## 2017-06-30 PROBLEM — A41.9 SEPSIS SECONDARY TO UTI: Status: RESOLVED | Noted: 2017-06-25 | Resolved: 2017-06-30

## 2017-06-30 PROBLEM — K59.00 OBSTIPATION: Status: ACTIVE | Noted: 2017-06-30

## 2017-06-30 PROBLEM — N39.0 SEPSIS SECONDARY TO UTI: Status: RESOLVED | Noted: 2017-06-25 | Resolved: 2017-06-30

## 2017-06-30 PROBLEM — J45.909 REACTIVE AIRWAY DISEASE: Status: ACTIVE | Noted: 2017-06-24

## 2017-06-30 LAB
ANION GAP SERPL CALC-SCNC: 9 MMOL/L
ANISOCYTOSIS BLD QL SMEAR: SLIGHT
BASOPHILS NFR BLD: 0 %
BUN SERPL-MCNC: 21 MG/DL
CALCIUM SERPL-MCNC: 8.3 MG/DL
CHLORIDE SERPL-SCNC: 95 MMOL/L
CO2 SERPL-SCNC: 25 MMOL/L
CREAT SERPL-MCNC: 0.8 MG/DL
DIFFERENTIAL METHOD: ABNORMAL
EOSINOPHIL NFR BLD: 0 %
ERYTHROCYTE [DISTWIDTH] IN BLOOD BY AUTOMATED COUNT: 15.3 %
EST. GFR  (AFRICAN AMERICAN): >60 ML/MIN/1.73 M^2
EST. GFR  (NON AFRICAN AMERICAN): >60 ML/MIN/1.73 M^2
GLUCOSE SERPL-MCNC: 129 MG/DL
HCT VFR BLD AUTO: 25.1 %
HGB BLD-MCNC: 8.4 G/DL
HYPOCHROMIA BLD QL SMEAR: ABNORMAL
LYMPHOCYTES NFR BLD: 4 %
MAGNESIUM SERPL-MCNC: 1.7 MG/DL
MCH RBC QN AUTO: 28.3 PG
MCHC RBC AUTO-ENTMCNC: 33.5 %
MCV RBC AUTO: 85 FL
METAMYELOCYTES NFR BLD MANUAL: 1 %
MONOCYTES NFR BLD: 6 %
NEUTROPHILS NFR BLD: 78 %
NEUTS BAND NFR BLD MANUAL: 11 %
PHOSPHATE SERPL-MCNC: 2.9 MG/DL
PLATELET # BLD AUTO: 271 K/UL
PLATELET BLD QL SMEAR: ABNORMAL
PMV BLD AUTO: 8.7 FL
POIKILOCYTOSIS BLD QL SMEAR: SLIGHT
POLYCHROMASIA BLD QL SMEAR: ABNORMAL
POTASSIUM SERPL-SCNC: 4.5 MMOL/L
RBC # BLD AUTO: 2.97 M/UL
SODIUM SERPL-SCNC: 129 MMOL/L
STOMATOCYTES BLD QL SMEAR: PRESENT
WBC # BLD AUTO: 18.39 K/UL

## 2017-06-30 PROCEDURE — 80048 BASIC METABOLIC PNL TOTAL CA: CPT

## 2017-06-30 PROCEDURE — G8982 BODY POS GOAL STATUS: HCPCS | Mod: CJ

## 2017-06-30 PROCEDURE — 97163 PT EVAL HIGH COMPLEX 45 MIN: CPT

## 2017-06-30 PROCEDURE — 25000003 PHARM REV CODE 250: Performed by: HOSPITALIST

## 2017-06-30 PROCEDURE — 99900035 HC TECH TIME PER 15 MIN (STAT)

## 2017-06-30 PROCEDURE — 63600175 PHARM REV CODE 636 W HCPCS: Performed by: HOSPITALIST

## 2017-06-30 PROCEDURE — 94761 N-INVAS EAR/PLS OXIMETRY MLT: CPT

## 2017-06-30 PROCEDURE — 84100 ASSAY OF PHOSPHORUS: CPT

## 2017-06-30 PROCEDURE — 97803 MED NUTRITION INDIV SUBSEQ: CPT

## 2017-06-30 PROCEDURE — 85007 BL SMEAR W/DIFF WBC COUNT: CPT

## 2017-06-30 PROCEDURE — G8981 BODY POS CURRENT STATUS: HCPCS | Mod: CM

## 2017-06-30 PROCEDURE — 11000001 HC ACUTE MED/SURG PRIVATE ROOM

## 2017-06-30 PROCEDURE — 83735 ASSAY OF MAGNESIUM: CPT

## 2017-06-30 PROCEDURE — 85027 COMPLETE CBC AUTOMATED: CPT

## 2017-06-30 PROCEDURE — 36415 COLL VENOUS BLD VENIPUNCTURE: CPT

## 2017-06-30 RX ORDER — ONDANSETRON 2 MG/ML
8 INJECTION INTRAMUSCULAR; INTRAVENOUS EVERY 8 HOURS PRN
Status: DISCONTINUED | OUTPATIENT
Start: 2017-06-30 | End: 2017-07-01 | Stop reason: HOSPADM

## 2017-06-30 RX ORDER — LACTULOSE 10 G/15ML
15 SOLUTION ORAL ONCE
Status: COMPLETED | OUTPATIENT
Start: 2017-06-30 | End: 2017-06-30

## 2017-06-30 RX ORDER — BISACODYL 5 MG
10 TABLET, DELAYED RELEASE (ENTERIC COATED) ORAL ONCE
Status: COMPLETED | OUTPATIENT
Start: 2017-06-30 | End: 2017-06-30

## 2017-06-30 RX ORDER — FUROSEMIDE 40 MG/1
40 TABLET ORAL DAILY
Status: DISCONTINUED | OUTPATIENT
Start: 2017-06-30 | End: 2017-07-01 | Stop reason: HOSPADM

## 2017-06-30 RX ORDER — ALBUTEROL SULFATE 0.83 MG/ML
2.5 SOLUTION RESPIRATORY (INHALATION) EVERY 4 HOURS PRN
Qty: 270 ML | Refills: 3 | Status: SHIPPED | OUTPATIENT
Start: 2017-06-30 | End: 2017-07-01

## 2017-06-30 RX ORDER — FUROSEMIDE 40 MG/1
40 TABLET ORAL DAILY
Qty: 30 TABLET | Refills: 3 | Status: SHIPPED | OUTPATIENT
Start: 2017-06-30 | End: 2017-07-01

## 2017-06-30 RX ADMIN — POLYETHYLENE GLYCOL 3350 17 G: 17 POWDER, FOR SOLUTION ORAL at 09:06

## 2017-06-30 RX ADMIN — LEVOTHYROXINE SODIUM 50 MCG: 50 TABLET ORAL at 06:06

## 2017-06-30 RX ADMIN — FUROSEMIDE 40 MG: 40 TABLET ORAL at 10:06

## 2017-06-30 RX ADMIN — BISACODYL 10 MG: 5 TABLET, COATED ORAL at 03:06

## 2017-06-30 RX ADMIN — DOCUSATE SODIUM 100 MG: 100 CAPSULE, LIQUID FILLED ORAL at 09:06

## 2017-06-30 RX ADMIN — ONDANSETRON 8 MG: 2 INJECTION INTRAMUSCULAR; INTRAVENOUS at 05:06

## 2017-06-30 RX ADMIN — OXYCODONE HYDROCHLORIDE 10 MG: 5 TABLET ORAL at 01:06

## 2017-06-30 RX ADMIN — MORPHINE SULFATE 1 MG: 2 INJECTION, SOLUTION INTRAMUSCULAR; INTRAVENOUS at 05:06

## 2017-06-30 RX ADMIN — IBUPROFEN 800 MG: 400 TABLET, FILM COATED ORAL at 10:06

## 2017-06-30 RX ADMIN — OXYCODONE HYDROCHLORIDE 10 MG: 5 TABLET ORAL at 06:06

## 2017-06-30 RX ADMIN — DOCUSATE SODIUM 100 MG: 100 CAPSULE, LIQUID FILLED ORAL at 10:06

## 2017-06-30 RX ADMIN — MAGNESIUM OXIDE TAB 400 MG (241.3 MG ELEMENTAL MG) 400 MG: 400 (241.3 MG) TAB at 10:06

## 2017-06-30 RX ADMIN — OXYCODONE HYDROCHLORIDE 15 MG: 5 TABLET ORAL at 10:06

## 2017-06-30 RX ADMIN — MAGNESIUM OXIDE TAB 400 MG (241.3 MG ELEMENTAL MG) 400 MG: 400 (241.3 MG) TAB at 03:06

## 2017-06-30 RX ADMIN — POLYETHYLENE GLYCOL 3350 17 G: 17 POWDER, FOR SOLUTION ORAL at 10:06

## 2017-06-30 RX ADMIN — DOXYCYCLINE HYCLATE 100 MG: 100 TABLET, COATED ORAL at 09:06

## 2017-06-30 RX ADMIN — LACTULOSE 15 G: 20 SOLUTION ORAL at 03:06

## 2017-06-30 RX ADMIN — OXYCODONE HYDROCHLORIDE 10 MG: 5 TABLET ORAL at 09:06

## 2017-06-30 RX ADMIN — LUBIPROSTONE 8 MCG: 8 CAPSULE, GELATIN COATED ORAL at 10:06

## 2017-06-30 RX ADMIN — FLUTICASONE PROPIONATE 2 SPRAY: 50 SPRAY, METERED NASAL at 10:06

## 2017-06-30 RX ADMIN — DOXYCYCLINE HYCLATE 100 MG: 100 TABLET, COATED ORAL at 10:06

## 2017-06-30 NOTE — PT/OT/SLP EVAL
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       bilateral. Sept 2015.        Referring physician: Dr. Mccarthy  Date referred to PT: 6/30/2017    General Precautions: Standard, fall  Orthopedic Precautions: N/A   Braces: N/A       Do you have any cultural, spiritual, Roman Catholic conflicts, given your current situation?: none    Patient History:  Living Environment Comment: Pt lives with her  and son in a [facility] with 7 YANI with single HR (R on [address]). Pt was functioning at independent level with occasional use of SBQC for mobility when she would have R knee pain. Pt was driving until a few weeks ago and was I with all self-care and ADLs per her report.  Equipment Currently Used at Home: cane, quad  DME owned (not currently used): [employer] (used occasionally when she has R knee pain)    Previous Level of Function:  Ambulation Skills: independent  Transfer Skills: independent  ADL Skills: independent  Work/Leisure Activity: independent    Subjective:  Communicated with ARIANNA Borja prior to session.  Pt reports L leg pain with all movements and activities.     Chief Complaint: Left leg pain  Patient goals: To lower pain and increase mobility    Pain/Comfort  Pain  Rating 1:  (Pt reports no pain in supine but pain in LLE with movement (pt does not rate her pain))  Location - Side 1: Left  Location 1: leg  Pain Addressed 1: Distraction, Reposition      Objective:   Patient found with: TAMIA drain     Cognitive Exam:  Oriented to: Person, Place, Time and Situation    Follows Commands/attention: Follows one-step commands  Communication: clear/fluent  Safety awareness/insight to disability: impaired    Physical Exam:  Postural examination/scapula alignment: Rounded shoulder, Head forward and Kyphosis    Skin integrity: Visible skin intact  Edema: Severe BLE (L>R)    Sensation:   Intact    Lower Extremity Range of Motion:  Right Lower Extremity: Deficits: hip/knee ROM limited by edema  Left Lower Extremity: Deficits: hip/knee ROM limited by edema    Lower Extremity Strength:  Right Lower Extremity: Deficits: hip/knee/ankle strength limited by pain and edema  Left Lower Extremity: Deficits: hip/knee/ankle strength limited by pain and edema     Functional Mobility:  Bed Mobility:  Supine to Sit: With side rail, Minimum Assistance (Min A for boost at trunk with HOB elevated 30-40 deg. Pt requires significantly increased time and effort to complete.)  Sit to Supine: Moderate Assistance (for BLEs into bed)    Transfers:  Sit <> Stand Assistance: Moderate Assistance (did not assume full upright position)  Sit <> Stand Assistive Device: Rolling Walker  Bed <> Chair Assistance: Activity did not occur    Gait:   Gait Distance: did not occur 2' to pt's significantly limited tolerance to standing    Balance:   Static Sit: FAIR-: Maintains without assist but inconsistent   Dynamic Sit: 0: N/A  Static Stand: 0: Needs MAXIMAL assist to maintain   Dynamic stand: 0: N/A      AM-PAC 6 CLICK MOBILITY  How much help from another person does this patient currently need?   1 = Unable, Total/Dependent Assistance  2 = A lot, Maximum/Moderate Assistance  3 = A little, Minimum/Contact Guard/Supervision  4 =  None, Modified [place]/Independent    Turning over in bed (including adjusting bedclothes, sheets and blankets)?: 2  Sitting down on and standing up from a chair with arms (e.g., wheelchair, bedside commode, etc.): 2  Moving from lying on back to sitting on the side of the bed?: 2  Moving to and from a bed to a chair (including a wheelchair)?: 1  Need to walk in hospital room?: 1  Climbing 3-5 steps with a railing?: 1  Total Score: 9     AM-PAC Raw Score CMS G-Code Modifier Level of Impairment Assistance   6 % Total / Unable   7 - 9 CM 80 - 100% Maximal Assist   10 - 14 CL 60 - 80% Moderate Assist   15 - 19 CK 40 - 60% Moderate Assist   20 - 22 CJ 20 - 40% Minimal Assist   23 CI 1-20% SBA / CGA   24 CH 0% Independent/ Mod I     Patient left supine with call button in reach, bed alarm on, RN notified and pt's daughter  present.    Assessment:   La Summers is a 72 y.o. female with a medical diagnosis of Sepsis secondary to UTI and presents with significant LE edema, LLE pain, impaired sitting/standing tolerance, and need for significant assist for CTS and standing. Pt with anxiety during sitting and standing and was unable to participate in transfer this date. Pt was at independent level prior to admission with occasional use of SBQC when R knee pain aggravated. Currently recommend placement in SNF.    Rehab identified problem list/impairments: Rehab identified problem list/impairments: weakness, impaired endurance, impaired functional mobilty, impaired balance, decreased lower extremity function, decreased safety awareness, pain, decreased ROM, edema, impaired cardiopulmonary response to activity    Rehab potential is fair.    Activity tolerance: Poor    Discharge recommendations: Discharge Facility/Level Of Care Needs: nursing facility, skilled     Barriers to discharge: Barriers to Discharge: Decreased caregiver support    Equipment recommendations: Equipment Needed After Discharge:  (TBD)      GOALS:    Physical Therapy Goals        Problem: Physical Therapy Goal    Goal Priority Disciplines Outcome Goal Variances Interventions   Physical Therapy Goal     PT/OT, PT Ongoing (interventions implemented as appropriate)     Description:  Goals to be met by: 17     Patient will increase functional independence with mobility by performin. Supine to sit with Modified [place]  2. Sit to supine with Modified [place]  3. Sit to stand transfer with Stand-by Assistance  4. Bed to chair transfer with Contact Guard Assistance using Rolling Walker  5. Gait  x 30 feet with Contact Guard Assistance using Rolling Walker.                       PLAN:    Patient to be seen 5 x/week to address the above listed problems via gait training, therapeutic activities, therapeutic exercises, neuromuscular re-education  Plan of Care expires: 17  Plan of Care reviewed with: patient, daughter    Functional Assessment Tool Used: AMPAC  Score: 9  Functional Limitation: Changing and maintaining body position  Changing and Maintaining Body Position Current Status (): CM  Changing and Maintaining Body Position Goal Status (): REGINA De La Rosa, PT  2017

## 2017-06-30 NOTE — PLAN OF CARE
TN spoke with Reyna from [facility]   family has done paperwork for home hospice    --  all equipment (including oxygen) has been delivered to home .    awaiting d/c per MD   TN spoke with pt's daughter Alexandria - she states that pt is unable to sit in a wheelchair or chair due to her cancerous tumors.  Pt is unable to endure the pain that sitting presents.       weekend  leyda Holt aware of this pt        06/30/17 7297   Discharge Reassessment   Assessment Type Discharge Planning Reassessment   Can the patient answer the patient profile reliably? Yes, cognitively intact   How often would a person be available to care for the patient? Whenever needed   Discharge plan remains the same: No   Provided patient/caregiver education on the expected discharge date and the discharge plan Yes   Discharge Plan A Home;Home with family;Hospice/home

## 2017-06-30 NOTE — PROGRESS NOTES
[facility] Medicine  Progress Note    Patient Name: La Summers  MRN: [id number]  Patient Class: IP- Inpatient   Admission Date: 6/24/2017  Length of Stay: 5 days  Attending Physician: Parviz Mccarthy MD  Primary Care Provider: Suzette Mccray MD        Subjective:     Principal Problem:Sepsis secondary to UTI    HPI:  La Summers is a 72 y.o. 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  She lives in [place], [place] with her  and son.  Her daughter Alexandria is a nurse.  Her primary care physician is Dr. Mary Ann Mccray.  Her gynecologic oncologist is Dr. Dougie Sanchez.      Dr. Sanchez prescribed her ciprofloxacin for a UTI on 6/23/17.  She had fever of 101.7 F at the time.  She went to [facility] ED on 6/24/17 with shortness of breath that began in the afternoon, with mucous congestion.  She had poor appetite and felt too weak to stand.  Due to mechanical obstruction by her cancer, she must stand to urinate.  Dr. Sanchez had referred to her to urologist Dr. Savage Quinonez to address this worsening problem.  She had bilateral leg edema.  She was not hypoxic.  She was tachypnic (respiratory rate 22) and put on nasal cannula.  Blood pressure was 109/56.  Labs showed leukocytosis (WBC 22,840), anemia (hemoglobin 8.4, hematocrit 26.8), hypokalemia (3.1), acute kidney injury (creatinine 1.2 from 0.8 on 9/13/16), hypoalbuminemia (2.0), elevated BNP (298).  Urinalysis had >100  WBC/hpf, moderate WBC clumps, and moderate bacteria.  Chest CTA showed no pulmonary embolism, but showed nonspecific dilation of the left pulmonary artery, and mosaic pattern of the pulmonary parenchyma suggesting reactive airway disease or chronic thromboembolic disease.  She denied history of ever having asthma, COPD, or pulmonary embolism.  She was given IV morphine, ondansetron, and normal saline, and admitted to [facility].  She was also found to have severe hypomagnesemia (1.0).    Hospital Course:  Echocardiogram showed moderate to severe aortic stenosis (GEORGE = 1.22 cm2, peak velocity = 3.88 m/s, mean gradient = 41 mmHg), diastolic dysfunction, and pulmonary hypertension.  She was given cefepime and vancomycin.  Urine culture had no growth but WBC count increased.  Renal function improved to normal.  She was given magnesium until level was normal.  Her case was discussed with Dr. Sanchez, who requested that Urology see her.  Urologist Dr. Dariel Singh advised suprapubic catheter placement by Interventional Radiology to avoid a large open incision, and it was done by radiologist Dr. Rinku Laws.  After the procedure she had chest pain and shortness of breath, with wheeze on exam.  Oxygen saturation was 98% on room air.  EKG showed right bundle branch block.  Troponin was normal.  Chest x-ray was unremarkable as it was on admission.  She was treated with albuterol nebulizer treatments, which improved her wheeze.  Repeat urinalysis from the suprapubic catheter showed a vast improvement, with 2 WBC/hpf and rare bacteria.  WBC count decreased.  She had lower extremity edema so her daughter felt furosemide would help.  Mrs. Summers opted to be DNR so that her chronic pain could be treated despite her hypotension.  She had transient atrial fibrillation after her first dose of furosemide.  She diuresed well, chest pain resolved, and leg edema improved.  On 6/30/17 she reported that she had not  had a bowel movement since 6/21/17 and had not stood up since 6/27/17.    Interval History: Still significant pitting leg edema.  Obstipated.  Has not stood up since suprapubic catheter placement.  Planning to go home with home hospice and does not want to return to the hospital.      Review of Systems   Respiratory: Negative for cough and shortness of breath.    Cardiovascular: Negative for chest pain and palpitations.   Gastrointestinal: Positive for constipation. Negative for abdominal pain.   Genitourinary: Positive for pelvic pain and vaginal pain.     Objective:     Vital Signs (Most Recent):  Temp: 98.2 °F (36.8 °C) (06/30/17 0800)  Pulse: 96 (06/30/17 0800)  Resp: 19 (06/30/17 0800)  BP: (!) 108/55 (06/30/17 0800)  SpO2: (!) 93 % (06/30/17 0759) Vital Signs (24h Range):  Temp:  [97.1 °F (36.2 °C)-100 °F (37.8 °C)] 98.2 °F (36.8 °C)  Pulse:  [] 96  Resp:  [16-20] 19  SpO2:  [90 %-97 %] 93 %  BP: ()/(50-63) 108/55     Weight: 106.1 kg (233 lb 14.5 oz)  Body mass index is 45.68 kg/m².    Intake/Output Summary (Last 24 hours) at 06/30/17 1048  Last data filed at 06/30/17 0445   Gross per 24 hour   Intake              640 ml   Output             1725 ml   Net            -1085 ml      Physical Exam   Constitutional: She is oriented to person, place, and time. She appears well-developed and well-nourished.   Pulmonary/Chest: Effort normal. She has no rhonchi. She has no rales.   Abdominal: Soft. Bowel sounds are normal. There is no tenderness.   Genitourinary:   Genitourinary Comments: Suprapubic catheter   Musculoskeletal: She exhibits edema.   Neurological: She is alert and oriented to person, place, and time.   Skin: Skin is warm and dry.   Psychiatric: She has a normal mood and affect. Her behavior is normal.   Nursing note and vitals reviewed.      Significant Labs:   CBC:     Recent Labs  Lab 06/29/17  0812 06/30/17  0435   WBC 20.11* 18.39*   HGB 8.7* 8.4*   HCT 27.0* 25.1*    271     CMP:      Recent Labs  Lab 06/29/17  0812 06/30/17  0435   * 129*   K 3.2* 4.5   CL 96 95   CO2 28 25   * 129*   BUN 18 21   CREATININE 0.7 0.8   CALCIUM 8.7 8.3*   ANIONGAP 8 9   EGFRNONAA >60 >60     Magnesium:     Recent Labs  Lab 06/29/17  0812 06/30/17  0435   MG 1.7 1.7       Significant Imaging: I have reviewed all pertinent imaging results/findings within the past 24 hours.  None new    Assessment/Plan:      Acute diastolic heart failure    Furosemide 40 mg PO daily.  Prescribed.  Check ambulatory O2 saturation.          Reactive airway disease    Continue albuterol nebulizer PRN.  Prescribe albuterol and a nebulizer.          Aortic stenosis    Contributes to heart failure.        Chronic neoplasm-related pain    On oxycodone 10-15 mg prn with acetaminophen and ibuprofen.           Acute cystitis without hematuria    Stop cefepime (got 5 days of cephalosporin).  Continue doxycycline for a 7 day course.          Therapeutic opioid induced constipation    Mechanical obstruction of the intestine  Obstipation  Takes lubiprostone 8 mcg BID and polyethylene glycol.  Continue.  Abdominal x-ray.  Give bisacodyl and lactulose now.          Essential hypertension    Stopped triamterene-hydrochlorothiazide 37.5-25 mg daily.          Acquired hypothyroidism    Continue levothyroxine 50 mcg daily.          Obstructive sleep apnea on CPAP    Continue CPAP nightly.          Vulvar cancer    Mechanical obstruction of urinary tract  On palliative care for her cancer.  Followed by Dr. Dougie Sanchez.  Appreciate Urology and IR.  Suprapubic catheter placed.  DNR.  PT evaluation for related debility.            VTE Risk Mitigation         Ordered     Medium Risk of VTE  Once      06/25/17 0348     Place MIRIAM hose  Until discontinued      06/25/17 0348     Place sequential compression device  Until discontinued      06/25/17 0348        Disposition: Will get home hospice, physical therapy, nebulizer, furosemide.  Treat  obstipation.  Evaluate other needs for discharge.      Parviz Mccarthy MD  Department of Hospital Medicine   [facility]

## 2017-06-30 NOTE — PLAN OF CARE
"Problem: Patient Care Overview  Goal: Plan of Care Review  Outcome: Ongoing (interventions implemented as appropriate)  Pt is AAOx3 with complaints of pain in L leg and back. Pt given morphine and oxy IR. Pt with one episode of nausea with relief from zofran IV. Pt had PT eval done today and was not able to ambulate for pulse ox eval. Pt given multiple laxatives/stool softeners. Pt states that she had BM but family states it was 'drainage from tumors around groin/butt area". Pt daughter in room, involved in patients care, often telling patient what she should be doing and pressuring patient into accepting meds/treatments. Pt's catheter putting out clear yellow urine.       "

## 2017-06-30 NOTE — SUBJECTIVE & OBJECTIVE
Interval History: Still significant pitting leg edema.  Obstipated.  Has not stood up since suprapubic catheter placement.  Planning to go home with home hospice and does not want to return to the hospital.      Review of Systems   Respiratory: Negative for cough and shortness of breath.    Cardiovascular: Negative for chest pain and palpitations.   Gastrointestinal: Positive for constipation. Negative for abdominal pain.   Genitourinary: Positive for pelvic pain and vaginal pain.     Objective:     Vital Signs (Most Recent):  Temp: 98.2 °F (36.8 °C) (06/30/17 0800)  Pulse: 96 (06/30/17 0800)  Resp: 19 (06/30/17 0800)  BP: (!) 108/55 (06/30/17 0800)  SpO2: (!) 93 % (06/30/17 0759) Vital Signs (24h Range):  Temp:  [97.1 °F (36.2 °C)-100 °F (37.8 °C)] 98.2 °F (36.8 °C)  Pulse:  [] 96  Resp:  [16-20] 19  SpO2:  [90 %-97 %] 93 %  BP: ()/(50-63) 108/55     Weight: 106.1 kg (233 lb 14.5 oz)  Body mass index is 45.68 kg/m².    Intake/Output Summary (Last 24 hours) at 06/30/17 1048  Last data filed at 06/30/17 0445   Gross per 24 hour   Intake              640 ml   Output             1725 ml   Net            -1085 ml      Physical Exam   Constitutional: She is oriented to person, place, and time. She appears well-developed and well-nourished.   Pulmonary/Chest: Effort normal. She has no rhonchi. She has no rales.   Abdominal: Soft. Bowel sounds are normal. There is no tenderness.   Genitourinary:   Genitourinary Comments: Suprapubic catheter   Musculoskeletal: She exhibits edema.   Neurological: She is alert and oriented to person, place, and time.   Skin: Skin is warm and dry.   Psychiatric: She has a normal mood and affect. Her behavior is normal.   Nursing note and vitals reviewed.      Significant Labs:   CBC:     Recent Labs  Lab 06/29/17  0812 06/30/17  0435   WBC 20.11* 18.39*   HGB 8.7* 8.4*   HCT 27.0* 25.1*    271     CMP:     Recent Labs  Lab 06/29/17  0812 06/30/17  0435   * 129*   K  3.2* 4.5   CL 96 95   CO2 28 25   * 129*   BUN 18 21   CREATININE 0.7 0.8   CALCIUM 8.7 8.3*   ANIONGAP 8 9   EGFRNONAA >60 >60     Magnesium:     Recent Labs  Lab 06/29/17  0812 06/30/17  0435   MG 1.7 1.7       Significant Imaging: I have reviewed all pertinent imaging results/findings within the past 24 hours.  None new

## 2017-06-30 NOTE — ASSESSMENT & PLAN NOTE
Mechanical obstruction of urinary tract  On palliative care for her cancer.  Followed by Dr. Dougie Sanchez.  Appreciate Urology and IR.  Suprapubic catheter placed.  DNR.  PT evaluation for related debility.

## 2017-06-30 NOTE — PLAN OF CARE
Problem: Physical Therapy Goal  Goal: Physical Therapy Goal  Goals to be met by: 17     Patient will increase functional independence with mobility by performin. Supine to sit with Modified [place]  2. Sit to supine with Modified [place]  3. Sit to stand transfer with Stand-by Assistance  4. Bed to chair transfer with Contact Guard Assistance using Rolling Walker  5. Gait  x 30 feet with Contact Guard Assistance using Rolling Walker.     Outcome: Ongoing (interventions implemented as appropriate)  Pt evaluated this date and will benefit from continued skilled PT services.

## 2017-06-30 NOTE — PLAN OF CARE
Problem: Nutrition, Imbalanced: Inadequate Oral Intake (Adult)  Goal: Improved Oral Intake  Patient will demonstrate the desired outcomes by discharge/transition of care.  Outcome: Ongoing (interventions implemented as appropriate)  Recommendation/Intervention:   1. Encourage good intake at meals.    Goals:   Meet 85% EEN  Nutrition Goal Status: progressing towards goal

## 2017-06-30 NOTE — PLAN OF CARE
Problem: Patient Care Overview  Goal: Plan of Care Review  Outcome: Ongoing (interventions implemented as appropriate)  Pt. On room air in no apparent distress. Will cont. To monitor.

## 2017-06-30 NOTE — PROGRESS NOTES
[facility]  Adult Nutrition  Progress Note    SUMMARY     Recommendations    Recommendation/Intervention:   1. Encourage good intake at meals.    Goals:   Meet 85% EEN  Nutrition Goal Status: progressing towards goal     Continuum of Care Plan  Referral to Outpatient Services:  (D/C planning: low sodium diet with supplements as needed)    Reason for Assessment  Reason for Assessment: RD follow-up  Diagnosis:  (sepsis)  Relevent Medical History: Ca; HTN; HF      General Information Comments: Pt on low Na diet. Pt with ~25% intake at meals. Pt reports poor intake 2/2 tired/decreased appetite. Pt with inadequate energy intake related to dx as evidenced by decreased appetite.     Nutrition Prescription Ordered  Current Diet Order: low Na     Nutrition/Diet History  Food Preferences: no Faith or cultural food prefs identified  Factors Affecting Nutritional Intake: decreased appetite     Labs/Tests/Procedures/Meds  Pertinent Labs Reviewed: reviewed  Pertinent Labs Comments: Na 129L, GLu 129H, Ca 8.3l  Pertinent Medications Reviewed: reviewed  Pertinent Medications Comments: Lasix    Physical Findings  Overall Physical Appearance: obese  Tubes:  (none)  Oral/Mouth Cavity: WDL  Skin:  ([place] 15-stg I buttocks)    Anthropometrics  Temp: 98.2 °F (36.8 °C)  Height: 5' (152.4 cm)  Weight Method: Bed Scale  Weight: 106.1 kg (233 lb 14.5 oz)  Ideal Body Weight (IBW), Female: 100 lb  % Ideal Body Weight, Female (lb): 233.91 lb  BMI (Calculated): 45.8  BMI Grade: greater than 40 - morbid obesity     Estimated/Assessed Needs  Weight Used For Calorie Calculations: 106.1 kg (233 lb 14.5 oz) (48 kg IBW)   Energy Need Method: [place]-St Jeor (6324-7601 kcal)  RMR ([place]-St. Jeor Equation): 1502.5  Weight Used For Protein Calculations: 106.1 kg (233 lb 14.5 oz) (IBW 48 kg)  Protein Requirements: 85 g  Fluid Need Method: RDA Method     Assessment and Plan  No new Assessment & Plan notes have been filed under this  hospital service since the last note was generated.  Service: Nutrition    Monitor and Evaluation  Food and Nutrient Intake: energy intake, food and beverage intake  Food and Nutrient Adminstration: diet order  Anthropometric Measurements: weight, weight change  Nutrition-Focused Physical Findings: overall appearance    Nutrition Risk  Level of Risk:  (2 x week)    Nutrition Follow-Up  RD Follow-up?: Yes

## 2017-06-30 NOTE — ASSESSMENT & PLAN NOTE
Mechanical obstruction of the intestine  Obstipation  Takes lubiprostone 8 mcg BID and polyethylene glycol.  Continue.  Abdominal x-ray.  Give bisacodyl and lactulose now.

## 2017-07-01 VITALS
HEIGHT: 60 IN | OXYGEN SATURATION: 95 % | TEMPERATURE: 99 F | BODY MASS INDEX: 45.93 KG/M2 | WEIGHT: 233.94 LBS | RESPIRATION RATE: 18 BRPM | HEART RATE: 95 BPM | SYSTOLIC BLOOD PRESSURE: 104 MMHG | DIASTOLIC BLOOD PRESSURE: 61 MMHG

## 2017-07-01 PROBLEM — N30.00 ACUTE CYSTITIS WITHOUT HEMATURIA: Status: RESOLVED | Noted: 2017-06-23 | Resolved: 2017-07-01

## 2017-07-01 PROBLEM — K59.00 OBSTIPATION: Status: RESOLVED | Noted: 2017-06-30 | Resolved: 2017-07-01

## 2017-07-01 LAB
ANION GAP SERPL CALC-SCNC: 9 MMOL/L
ANISOCYTOSIS BLD QL SMEAR: SLIGHT
BASOPHILS # BLD AUTO: ABNORMAL K/UL
BASOPHILS NFR BLD: 0 %
BUN SERPL-MCNC: 22 MG/DL
CALCIUM SERPL-MCNC: 8.7 MG/DL
CHLORIDE SERPL-SCNC: 94 MMOL/L
CO2 SERPL-SCNC: 27 MMOL/L
CREAT SERPL-MCNC: 0.8 MG/DL
DIFFERENTIAL METHOD: ABNORMAL
EOSINOPHIL # BLD AUTO: ABNORMAL K/UL
EOSINOPHIL NFR BLD: 6 %
ERYTHROCYTE [DISTWIDTH] IN BLOOD BY AUTOMATED COUNT: 15.4 %
EST. GFR  (AFRICAN AMERICAN): >60 ML/MIN/1.73 M^2
EST. GFR  (NON AFRICAN AMERICAN): >60 ML/MIN/1.73 M^2
GLUCOSE SERPL-MCNC: 123 MG/DL
HCT VFR BLD AUTO: 26.7 %
HGB BLD-MCNC: 8.6 G/DL
HYPOCHROMIA BLD QL SMEAR: ABNORMAL
LYMPHOCYTES # BLD AUTO: ABNORMAL K/UL
LYMPHOCYTES NFR BLD: 7 %
MAGNESIUM SERPL-MCNC: 1.9 MG/DL
MCH RBC QN AUTO: 27.5 PG
MCHC RBC AUTO-ENTMCNC: 32.2 %
MCV RBC AUTO: 85 FL
MONOCYTES # BLD AUTO: ABNORMAL K/UL
MONOCYTES NFR BLD: 6 %
NEUTROPHILS NFR BLD: 76 %
NEUTS BAND NFR BLD MANUAL: 5 %
PHOSPHATE SERPL-MCNC: 2.9 MG/DL
PLATELET # BLD AUTO: 288 K/UL
PLATELET BLD QL SMEAR: ABNORMAL
PMV BLD AUTO: 8.9 FL
POIKILOCYTOSIS BLD QL SMEAR: SLIGHT
POTASSIUM SERPL-SCNC: 4.1 MMOL/L
RBC # BLD AUTO: 3.13 M/UL
SODIUM SERPL-SCNC: 130 MMOL/L
WBC # BLD AUTO: 12.49 K/UL

## 2017-07-01 PROCEDURE — 84100 ASSAY OF PHOSPHORUS: CPT

## 2017-07-01 PROCEDURE — 83735 ASSAY OF MAGNESIUM: CPT

## 2017-07-01 PROCEDURE — 94640 AIRWAY INHALATION TREATMENT: CPT

## 2017-07-01 PROCEDURE — 85027 COMPLETE CBC AUTOMATED: CPT

## 2017-07-01 PROCEDURE — 36415 COLL VENOUS BLD VENIPUNCTURE: CPT

## 2017-07-01 PROCEDURE — 85007 BL SMEAR W/DIFF WBC COUNT: CPT

## 2017-07-01 PROCEDURE — 25000003 PHARM REV CODE 250: Performed by: HOSPITALIST

## 2017-07-01 PROCEDURE — 25000242 PHARM REV CODE 250 ALT 637 W/ HCPCS: Performed by: HOSPITALIST

## 2017-07-01 PROCEDURE — 80048 BASIC METABOLIC PNL TOTAL CA: CPT

## 2017-07-01 PROCEDURE — 94761 N-INVAS EAR/PLS OXIMETRY MLT: CPT

## 2017-07-01 RX ORDER — FUROSEMIDE 40 MG/1
40 TABLET ORAL DAILY
Qty: 30 TABLET | Refills: 3 | Status: SHIPPED | OUTPATIENT
Start: 2017-07-01 | End: 2018-07-01

## 2017-07-01 RX ORDER — OXYCODONE HYDROCHLORIDE 15 MG/1
15 TABLET ORAL EVERY 4 HOURS PRN
Qty: 30 TABLET | Refills: 0 | Status: SHIPPED | OUTPATIENT
Start: 2017-07-01

## 2017-07-01 RX ORDER — POLYETHYLENE GLYCOL 3350 17 G/17G
17 POWDER, FOR SOLUTION ORAL 2 TIMES DAILY
Refills: 0
Start: 2017-07-01

## 2017-07-01 RX ORDER — BISACODYL 5 MG
5 TABLET, DELAYED RELEASE (ENTERIC COATED) ORAL DAILY PRN
Refills: 0 | COMMUNITY
Start: 2017-07-01

## 2017-07-01 RX ORDER — ALBUTEROL SULFATE 0.83 MG/ML
2.5 SOLUTION RESPIRATORY (INHALATION) EVERY 4 HOURS PRN
Qty: 270 ML | Refills: 3 | Status: SHIPPED | OUTPATIENT
Start: 2017-07-01 | End: 2018-07-01

## 2017-07-01 RX ADMIN — DOXYCYCLINE HYCLATE 100 MG: 100 TABLET, COATED ORAL at 08:07

## 2017-07-01 RX ADMIN — FUROSEMIDE 40 MG: 40 TABLET ORAL at 08:07

## 2017-07-01 RX ADMIN — ALBUTEROL SULFATE 2.5 MG: 2.5 SOLUTION RESPIRATORY (INHALATION) at 02:07

## 2017-07-01 RX ADMIN — OXYCODONE HYDROCHLORIDE 15 MG: 5 TABLET ORAL at 02:07

## 2017-07-01 RX ADMIN — POLYETHYLENE GLYCOL 3350 17 G: 17 POWDER, FOR SOLUTION ORAL at 08:07

## 2017-07-01 RX ADMIN — LEVOTHYROXINE SODIUM 50 MCG: 50 TABLET ORAL at 05:07

## 2017-07-01 RX ADMIN — DOCUSATE SODIUM 100 MG: 100 CAPSULE, LIQUID FILLED ORAL at 08:07

## 2017-07-01 RX ADMIN — OXYCODONE HYDROCHLORIDE 15 MG: 5 TABLET ORAL at 05:07

## 2017-07-01 RX ADMIN — LUBIPROSTONE 8 MCG: 8 CAPSULE, GELATIN COATED ORAL at 08:07

## 2017-07-01 RX ADMIN — FLUTICASONE PROPIONATE 2 SPRAY: 50 SPRAY, METERED NASAL at 08:07

## 2017-07-01 NOTE — PLAN OF CARE
Problem: Patient Care Overview  Goal: Plan of Care Review  Patient remains free from falls. Medicated for pain PRN as ordered with good results. Patient weight shifted in bed when requested, hurts to move.

## 2017-07-01 NOTE — PROGRESS NOTES
Patient is AAOx3, NAD noted, family at the bedside.  IV removed.  Reviewed discharge instructions with patient.  Patient and family verbalized understanding.  Safety has been maintained.  EMS here to bring patient to home.

## 2017-07-01 NOTE — PLAN OF CARE
TN arranged transportation with [facility] [phone],  time for 1:30 pm    [facility] informed of pt's discharge [phone]    TN called pt's daughter Alexandria and informed her of discharge and transportation  time

## 2017-07-01 NOTE — PLAN OF CARE
Problem: Patient Care Overview  Goal: Plan of Care Review  Room air SpO2  92 %. Pt with no apparent distress noted. Will continue to monitor.

## 2017-07-01 NOTE — PROGRESS NOTES
[facility] Hospital Medicine  Progress Note    Patient Name: La Summers  MRN: [id number]  Patient Class: IP- Inpatient   Admission Date: 6/24/2017  Length of Stay: 6 days  Attending Physician: Parviz Mccarthy MD  Primary Care Provider: Suzette Mccray MD        Subjective:     Principal Problem:Sepsis secondary to UTI    HPI:  La Summers is a 72 y.o. 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  She lives in [place], [place] with her  and son.  Her daughter Alexandria is a nurse.  Her primary care physician is Dr. Mary Ann Mccray.  Her gynecologic oncologist is Dr. Dougie Sanchez.      Dr. Sanchez prescribed her ciprofloxacin for a UTI on 6/23/17.  She had fever of 101.7 F at the time.  She went to [facility] ED on 6/24/17 with shortness of breath that began in the afternoon, with mucous congestion.  She had poor appetite and felt too weak to stand.  Due to mechanical obstruction by her cancer, she must stand to urinate.  Dr. Sanchez had referred to her to urologist Dr. Savage Quinonez to address this worsening problem.  She had bilateral leg edema.  She was not hypoxic.  She was tachypnic (respiratory rate 22) and put on nasal cannula.  Blood pressure was 109/56.  Labs showed leukocytosis (WBC 22,840), anemia (hemoglobin 8.4, hematocrit 26.8), hypokalemia (3.1), acute kidney injury (creatinine 1.2 from 0.8 on 9/13/16), hypoalbuminemia (2.0), elevated BNP (298).  Urinalysis had >100  WBC/hpf, moderate WBC clumps, and moderate bacteria.  Chest CTA showed no pulmonary embolism, but showed nonspecific dilation of the left pulmonary artery, and mosaic pattern of the pulmonary parenchyma suggesting reactive airway disease or chronic thromboembolic disease.  She denied history of ever having asthma, COPD, or pulmonary embolism.  She was given IV morphine, ondansetron, and normal saline, and admitted to [facility].  She was also found to have severe hypomagnesemia (1.0).    Hospital Course:  Echocardiogram showed moderate to severe aortic stenosis (GEORGE = 1.22 cm2, peak velocity = 3.88 m/s, mean gradient = 41 mmHg), diastolic dysfunction, and pulmonary hypertension.  She was given cefepime and vancomycin.  Urine culture had no growth but WBC count increased.  Renal function improved to normal.  She was given magnesium until level was normal.  Her case was discussed with Dr. Sanchez, who requested that Urology see her.  Urologist Dr. Dariel Singh advised suprapubic catheter placement by Interventional Radiology to avoid a large open incision, and it was done by radiologist Dr. Rinku Laws.  After the procedure she had chest pain and shortness of breath, with wheeze on exam.  Oxygen saturation was 98% on room air.  EKG showed right bundle branch block.  Troponin was normal.  Chest x-ray was unremarkable as it was on admission.  She was treated with albuterol nebulizer treatments, which improved her wheeze.  Repeat urinalysis from the suprapubic catheter showed a vast improvement, with 2 WBC/hpf and rare bacteria.  WBC count decreased.  She had lower extremity edema so her daughter felt furosemide would help.  Mrs. Summers opted to be DNR so that her chronic pain could be treated despite her hypotension.  She had transient atrial fibrillation after her first dose of furosemide.  She diuresed well, chest pain resolved, and leg edema improved.  On 6/30/17 she reported that she had not  had a bowel movement since 6/21/17 and had not stood up since 6/27/17.  She was given bisacodyl and lactulose and subsequently had relief of obstipation.  She had difficulty standing because she must go directly from supine to standing, but was able to do it once.  Home hospice was set up with [facility] on Friday 6/30/17 in preparation for discharge on 7/1/17.      Interval History: Ready to go home.  Requesting a gurney for transport into home.    Review of Systems   Respiratory: Negative for cough and shortness of breath.    Cardiovascular: Negative for chest pain and palpitations.   Gastrointestinal: Positive for constipation. Negative for abdominal pain.   Genitourinary: Positive for pelvic pain and vaginal pain.     Objective:     Vital Signs (Most Recent):  Temp: 98.7 °F (37.1 °C) (07/01/17 0800)  Pulse: 96 (07/01/17 0800)  Resp: 18 (07/01/17 0800)  BP: (!) 110/55 (07/01/17 0800)  SpO2: 95 % (07/01/17 0800) Vital Signs (24h Range):  Temp:  [97.6 °F (36.4 °C)-98.7 °F (37.1 °C)] 98.7 °F (37.1 °C)  Pulse:  [] 96  Resp:  [17-18] 18  SpO2:  [92 %-97 %] 95 %  BP: (108-133)/(53-85) 110/55     Weight: 106.1 kg (233 lb 14.5 oz)  Body mass index is 45.68 kg/m².    Intake/Output Summary (Last 24 hours) at 07/01/17 1057  Last data filed at 07/01/17 0537   Gross per 24 hour   Intake             1100 ml   Output              775 ml   Net              325 ml      Physical Exam   Constitutional: She is oriented to person, place, and time. She appears well-developed and well-nourished.   Pulmonary/Chest: Effort normal. She has no rhonchi. She has no rales.   Abdominal: Soft. Bowel sounds are normal. There is no tenderness.   Genitourinary:   Genitourinary Comments: Suprapubic catheter   Musculoskeletal: She exhibits edema.   Neurological: She is alert and oriented to person, place, and time.   Skin: Skin is warm and dry.   Psychiatric: She has a normal mood and affect. Her behavior is normal.   Nursing note and  vitals reviewed.      Significant Labs:   CBC:     Recent Labs  Lab 06/30/17 0435 07/01/17 0429   WBC 18.39* 12.49   HGB 8.4* 8.6*   HCT 25.1* 26.7*    288     CMP:     Recent Labs  Lab 06/30/17 0435 07/01/17 0429   * 130*   K 4.5 4.1   CL 95 94*   CO2 25 27   * 123*   BUN 21 22   CREATININE 0.8 0.8   CALCIUM 8.3* 8.7   ANIONGAP 9 9   EGFRNONAA >60 >60     Magnesium:     Recent Labs  Lab 06/30/17 0435 07/01/17 0429   MG 1.7 1.9       Significant Imaging: I have reviewed all pertinent imaging results/findings within the past 24 hours.  None new    Assessment/Plan:      Acute diastolic heart failure    Furosemide 40 mg PO daily.  Prescribed.  Check ambulatory O2 saturation.          Reactive airway disease    Continue albuterol nebulizer PRN.  Prescribe albuterol and a nebulizer.          Aortic stenosis    Contributes to heart failure.        Chronic neoplasm-related pain    On oxycodone 10-15 mg prn with acetaminophen and ibuprofen.           Therapeutic opioid induced constipation    Mechanical obstruction of the intestine  Takes lubiprostone 8 mcg BID and polyethylene glycol.  Continue.  Obstipation resolved after bisacodyl and lactulose.  Prescribe these PRN.          Essential hypertension    Stopped triamterene-hydrochlorothiazide 37.5-25 mg daily.          Acquired hypothyroidism    Continue levothyroxine 50 mcg daily.          Obstructive sleep apnea on CPAP    Continue CPAP nightly.          Vulvar cancer    Mechanical obstruction of urinary tract  On palliative care for her cancer.  Followed by Dr. Dougie Sanchez.  Appreciate Urology and IR.  Suprapubic catheter placed.  DNR.  PT evaluation for related debility.            VTE Risk Mitigation         Ordered     Medium Risk of VTE  Once      06/25/17 0348     Place MIRIAM hose  Until discontinued      06/25/17 0348     Place sequential compression device  Until discontinued      06/25/17 0348        Disposition: Home hospice  today.    Parviz Mccarthy MD  Department of Hospital Medicine   [facility]

## 2017-07-01 NOTE — PLAN OF CARE
TN arranged transportation with [facility] [phone],  time for 1:30 pm    [facility] informed of pt's discharge [phone]    TN called pt's daughter Alexandria,[phone],  and informed her of discharge and transportation  time       07/01/17 1139   Final Note   Assessment Type Final Discharge Note   Discharge Disposition [facility]  ([facility] [phone])   What phone number can be called within the next 1-3 days to see how you are doing after discharge? [phone]   Hospital Follow Up  Appt(s) scheduled? No   Offered [facility] -- Bedside Delivery? n/a   Discharge/Hospital Encounter Summary to ([facility]) PCP Yes   Referral to Outpatient Case Management complete? n/a   Referral to / orders for Home Health Complete? n/a   30 day supply of medicines given at discharge, if documented non-compliance / non-adherence? n/a   Any social issues identified prior to discharge? n/a   Did you assess the readiness or willingness of the family or caregiver to support self management of care? n/a   Right Care Referral Info   Post Acute Recommendation Other  ([facility] [phone])   Referral Type [facility] [phone]   Facility Name [facility] [phone]     Yanet Funez, RN Transitional Navigator  [phone]

## 2017-07-01 NOTE — ASSESSMENT & PLAN NOTE
Mechanical obstruction of the intestine  Takes lubiprostone 8 mcg BID and polyethylene glycol.  Continue.  Obstipation resolved after bisacodyl and lactulose.  Prescribe these PRN.

## 2017-07-01 NOTE — PT/OT/SLP PROGRESS
Physical Therapy      La Summers  MRN: [id number]  Time 910 to 915    Patient found supine in bed with daughter at bedside. Patient reported much pain with prior PT session and at this time does not wish to participate reporting she may be DC Home today with Hospice, Will follow as able        Savage Alvarado, PT

## 2017-07-01 NOTE — DISCHARGE SUMMARY
[facility] Hospital Medicine  Discharge Summary      Patient Name: La Summers  MRN: [id number]  Admission Date: 6/24/2017  Hospital Length of Stay: 6 days  Discharge Date and Time: 7/1/2017  2:40 PM  Attending Physician: Parviz Mccarthy MD   Discharging Provider: Parviz Mccarthy MD  Primary Care Provider: Suzette Mccray MD      HPI:   La Summers is a 72 y.o. 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  She lives in [place], [place] with her  and son.  Her daughter Alexandria is a nurse.  Her primary care physician is Dr. Mary Ann Mccray.  Her gynecologic oncologist is Dr. Dougie Sanchez.      Dr. Sanchez prescribed her ciprofloxacin for a UTI on 6/23/17.  She had fever of 101.7 F at the time.  She went to [facility] ED on 6/24/17 with shortness of breath that began in the afternoon, with mucous congestion.  She had poor appetite and felt too weak to stand.  Due to mechanical obstruction by her cancer, she must stand to urinate.  Dr. Sanchez had referred to her to urologist Dr. Savage Quinonez to address this worsening problem.  She had bilateral leg edema.  She was not hypoxic.  She was tachypnic (respiratory rate 22) and put on nasal cannula.  Blood pressure was 109/56.  Labs showed leukocytosis (WBC 22,840), anemia (hemoglobin 8.4, hematocrit 26.8), hypokalemia (3.1), acute kidney injury (creatinine 1.2 from 0.8 on 9/13/16), hypoalbuminemia (2.0), elevated BNP (298).  Urinalysis had  >100 WBC/hpf, moderate WBC clumps, and moderate bacteria.  Chest CTA showed no pulmonary embolism, but showed nonspecific dilation of the left pulmonary artery, and mosaic pattern of the pulmonary parenchyma suggesting reactive airway disease or chronic thromboembolic disease.  She denied history of ever having asthma, COPD, or pulmonary embolism.  She was given IV morphine, ondansetron, and normal saline, and admitted to [facility].  She was also found to have severe hypomagnesemia (1.0).    Procedures:  IR Ultrasound Guidance 6/27/17: Sonographic guided placement of a suprapubic catheter was performed.     Indwelling Lines/Drains at time of discharge:   Lines/Drains/Airways     Drain                 Suprapubic Catheter 06/27/17 1208 other (see comments) 8 Fr. 3 days          Pressure Ulcer                 Pressure Ulcer 06/25/17 0530 Left buttocks Stage I 6 days              Hospital Course:   Echocardiogram showed moderate to severe aortic stenosis (GEORGE = 1.22 cm2, peak velocity = 3.88 m/s, mean gradient = 41 mmHg), diastolic dysfunction, and pulmonary hypertension.  She was given cefepime and vancomycin.  Urine culture had no growth but WBC count increased.  Renal function improved to normal.  She was given magnesium until level was normal.  Her case was discussed with Dr. Sanchez, who requested that Urology see her.  Urologist Dr. Dariel Singh advised suprapubic catheter placement by Interventional Radiology to avoid a large open incision, and it was done by radiologist Dr. Rinku Laws.  After the procedure she had chest pain and shortness of breath, with wheeze on exam.  Oxygen saturation was 98% on room air.  EKG showed right bundle branch block.  Troponin was normal.  Chest x-ray was unremarkable as it was on admission.  She was treated with albuterol nebulizer treatments, which improved her wheeze.  Repeat urinalysis from the suprapubic catheter showed a vast improvement, with 2 WBC/hpf  and rare bacteria.  WBC count decreased.  She had lower extremity edema so her daughter felt furosemide would help.  Mrs. Summers opted to be DNR so that her chronic pain could be treated despite her hypotension.  She had transient atrial fibrillation after her first dose of furosemide.  She diuresed well, chest pain resolved, and leg edema improved.  On 6/30/17 she reported that she had not had a bowel movement since 6/21/17 and had not stood up since 6/27/17.  She was given bisacodyl and lactulose and subsequently had relief of obstipation.  She had difficulty standing because she must go directly from supine to standing, but was able to do it once.  Home hospice was set up with [facility] on Friday 6/30/17 in preparation for discharge on 7/1/17.       Consults:   Consults         Status Ordering Provider     Inpatient consult to Dietary  Once     Provider:  (Not yet assigned)    Completed ABADCO, SELWYN A     Inpatient consult to Interventional Radiology  Once     Provider:  Max Schwarz MD    Completed DARIEL CUI     Inpatient consult to Social Work  Once     Provider:  (Not yet assigned)    Ordered ABATIFFANIEO, SELWYN A     Inpatient consult to Social Work/Case Management  Once     Provider:  (Not yet assigned)    Acknowledged JANA ROLLE H.     Inpatient consult to Social Work/Case Management  Once     Provider:  (Not yet assigned)    Acknowledged JANA ROLLE H.     Inpatient consult to Urology  Once     Provider:  Dariel Cui MD    Completed JANA ROLLE H.          Significant Diagnostic Studies:  CTA Chest Non-Coronary (PE Study) 6/25/17: CT pulmonary embolism examination:  There are no filling defects within the pulmonary artery suggesting pulmonary embolism.  There is nonspecific dilation of the left main pulmonary artery.  CT chest examination:  The thyroid gland is unremarkable.  The visualized portions of the base of the neck neck is within normal limits.  The  trachea and central airways are within normal limits.  There is no evidence of bronchiectasis.  The heart is normal in appearance.  Coronary artery calcifications are present.  There are no pericardial effusions.  The thoracic aorta is normal in caliber.  Scattered calcifications of the thoracic aorta.  The great vessels arising from aortic arch are within normal limits.  There is no evidence of lymphadenopathy in the chest.  The axillary regions are within normal limits.  There are no pleural effusions.  There is no evidence of a pneumothorax.  Evaluation for lung nodules is difficult given patient motion.  There is mosaic attenuation of the pulmonary parenchyma.  No focal consolidations are identified.  The esophagus is within normal limits.  The visualized upper abdominal structures are unremarkable.  There is diffuse osteopenia making evaluation difficult.  No definitive fracture is identified.  No blastic lesion is present.  Chest wall is unremarkable.  Impression:   1.  No evidence of pulmonary embolism.  2.  Mosaic attenuation of the pulmonary parenchyma.  The findings may be seen with reactive airway disease or chronic thromboembolic disease.  Suggest clinical correlation.  3.  Additional findings as above.  US Lower Extremity Veins Bilateral 6/25/17: No definite evidence to suggest deep venous thrombosis within either lower extremity as visualized.  Limited evaluation of the left femoral vein due to edema patient body habitus.  Posterior tibial vein on the left also not well-seen.  US Upper Extremity Arteries Right 6/25/17: No sonographic evidence to suggest right subclavian artery stenosis.  2D echo with color flow doppler 6/26/17:    1 - Normal left ventricular systolic function (EF 60-65%).     2 - Moderate to severe aortic stenosis, GEORGE = 1.22 cm2, peak velocity = 3.88 m/s, mean gradient = 41 mmHg.     3 - Impaired LV relaxation, elevated LAP (grade 2 diastolic dysfunction).     4 - Pulmonary  hypertension. The estimated PA systolic pressure is 45 mmHg.     5 - Concentric remodeling.     6 - The mitral valve is mildly sclerotic with mildly restricted leaflet mobility.   US Retroperitoneal Complete 6/27/17: Kidneys are difficult to visualize secondary to body habitus, particularly the left kidney.  The kidneys are normal in size measuring 10.9 cm on the right and 10.5 cm on the left. There is loss of normal corticomedullary differentiation which may be secondary to suboptimal visualization.  There is normal cortical thickness. No solid renal masses, nephrolithiasis, or hydronephrosis. Perfusion to the kidneys is decreased bilaterally. Resistive indices are normal on the right and elevated on the left and as follow: 0.68 on the right and 0.77 on the left. The urinary bladder appears normal.  X-Ray Chest AP Portable 6/27/17: There is no pneumothorax, pleural effusion or focal consolidation. The cardiac silhouette is enlarged.  X-Ray Abdomen Portable 6/30/17: Pelvic drain catheter is visualized.  Nonspecific bowel gas pattern without evidence for obstruction.  Scattered stool seen in the colon.  No free air identified.    Final Active Diagnoses:    Diagnosis Date Noted POA    Acute diastolic heart failure [I50.31] 06/25/2017 Yes    Chronic neoplasm-related pain [G89.3] 06/25/2017 Yes     Chronic    Aortic stenosis [I35.0] 06/25/2017 Yes     Chronic    Mechanical obstruction of urinary tract 06/24/2017 Yes     Chronic    Reactive airway disease [J45.909] 06/24/2017 Yes    Mechanical obstruction of the intestine [K56.69] 06/24/2017 Yes     Chronic    Therapeutic opioid induced constipation [K59.03, T40.2X5A] 07/15/2016 Yes     Chronic    Essential hypertension [I10] 02/02/2015 Yes     Chronic    Acquired hypothyroidism [E03.9] 02/02/2015 Yes     Chronic    Obstructive sleep apnea on CPAP [G47.33, Z99.89] 02/02/2015 Not Applicable     Chronic    Vulvar cancer [C51.9] 10/01/2010 Yes     Chronic       Problems Resolved During this Admission:    Diagnosis Date Noted Date Resolved POA    PRINCIPAL PROBLEM:  Sepsis secondary to UTI [A41.9, N39.0] 06/25/2017 06/30/2017 Yes    Obstipation [K59.00] 06/30/2017 07/01/2017 Yes    Hypokalemia [E87.6] 06/25/2017 06/26/2017 Yes    Acute kidney injury [N17.9] 06/25/2017 06/27/2017 Yes    Hypomagnesemia [E83.42] 06/25/2017 06/26/2017 Yes    Shortness of breath [R06.02] 06/24/2017 06/29/2017 Yes    Acute cystitis without hematuria [N30.00] 06/23/2017 07/01/2017 Yes      No new Assessment & Plan notes have been filed under this hospital service since the last note was generated.  Service: Hospital Medicine      Discharged Condition: good    Disposition: Hospice/Home    Follow Up:  Follow-up Information     [facility]    Specialty:  Hospice Services  Why:  Hospice  Contact information:  [address] [place] [address]  [address]  [facility] [id number] [phone]                 Patient Instructions:     NEBULIZER FOR HOME USE   Order Specific Question Answer Comments   Height: 5' (1.524 m)    Weight: 106.1 kg (233 lb 14.5 oz)    Does patient have medical equipment at home? cane, quad    Does patient have medical equipment at home? CPAP    Length of need (1-99 months): 99      Diet general   Order Specific Question Answer Comments   Na restriction, if any: 2gNa      Activity as tolerated     Call MD for:  severe uncontrolled pain       Medications:  Reconciled Home Medications:   Current Discharge Medication List      START taking these medications    Details   albuterol (PROVENTIL) 2.5 mg /3 mL (0.083 %) nebulizer solution Take 3 mLs (2.5 mg total) by nebulization every 4 (four) hours as needed for Wheezing (wheeze). Rescue  Qty: 270 mL, Refills: 3      bisacodyl (DULCOLAX) 5 mg EC tablet Take 1 tablet (5 mg total) by mouth daily as needed for Constipation.  Refills: 0      furosemide (LASIX) 40 MG tablet Take 1 tablet (40 mg total) by mouth once daily.  Qty: 30  tablet, Refills: 3      lactulose (CEPHULAC) 20 gram Pack Take 1 packet (20 g total) by mouth 3 (three) times daily as needed (constipation).  Qty: 30 packet, Refills: 3      oxycodone (ROXICODONE) 15 MG Tab Take 1 tablet (15 mg total) by mouth every 4 (four) hours as needed (severe pain).  Qty: 30 tablet, Refills: 0         CONTINUE these medications which have CHANGED    Details   polyethylene glycol (GLYCOLAX) 17 gram/dose powder Take 17 g by mouth 2 (two) times daily.  Refills: 0         CONTINUE these medications which have NOT CHANGED    Details   docusate sodium (COLACE) 100 MG capsule Take 1 capsule (100 mg total) by mouth 2 (two) times daily.  Qty: 60 capsule, Refills: 1      hydrocodone-acetaminophen 10-325mg (NORCO)  mg Tab Take 1 tablet by mouth every 4 (four) hours as needed.  Qty: 90 tablet, Refills: 0    Associated Diagnoses: Vulvar cancer      !! ibuprofen (ADVIL,MOTRIN) 600 MG tablet Take 1 tablet (600 mg total) by mouth every 6 (six) hours as needed for Pain.  Qty: 30 tablet, Refills: 0      !! ibuprofen (ADVIL,MOTRIN) 800 MG tablet Take 800 mg by mouth every 8 (eight) hours as needed for Pain.      levothyroxine (SYNTHROID) 50 MCG tablet Take 50 mcg by mouth before breakfast.       lidocaine HCL 2% (XYLOCAINE) 2 % jelly Apply to affected area daily prn  Qty: 30 mL, Refills: 2    Associated Diagnoses: Vulvar cancer      lubiprostone (AMITIZA) 8 MCG Cap Take 1 capsule (8 mcg total) by mouth 2 (two) times daily with meals.  Qty: 60 capsule, Refills: 2    Associated Diagnoses: Therapeutic opioid induced constipation      meclizine (ANTIVERT) 32 MG tablet Take 32 mg by mouth as needed.      nystatin (MYCOSTATIN) cream Apply topically 2 (two) times daily.  Qty: 30 g, Refills: 1    Associated Diagnoses: Candidiasis, cutaneous      nystatin (MYCOSTATIN) powder Apply 1 application topically daily as needed.      triamcinolone acetonide 0.1% (KENALOG) 0.1 % cream        !! - Potential duplicate  medications found. Please discuss with provider.      STOP taking these medications       aspirin (ECOTRIN) 81 MG EC tablet Comments:   Reason for Stopping:         ciprofloxacin HCl (CIPRO) 500 MG tablet Comments:   Reason for Stopping:         fish oil-omega-3 fatty acids 300-1,000 mg capsule Comments:   Reason for Stopping:         garlic 1 mg Cap Comments:   Reason for Stopping:         lovastatin (MEVACOR) 20 MG tablet Comments:   Reason for Stopping:         lovastatin (MEVACOR) 40 MG tablet Comments:   Reason for Stopping:         morphine 10 mg/5 mL solution Comments:   Reason for Stopping:         multivitamin capsule Comments:   Reason for Stopping:         silver sulfADIAZINE 1% (SILVADENE) 1 % cream Comments:   Reason for Stopping:         triamterene-hydrochlorothiazide 37.5-25 mg (DYAZIDE) 37.5-25 mg per capsule Comments:   Reason for Stopping:             Time spent on the discharge of patient: 35 minutes    Parviz Mccarthy MD  Department of Hospital Medicine  [facility]

## 2017-07-01 NOTE — SUBJECTIVE & OBJECTIVE
Interval History: Ready to go home.  Requesting a gurney for transport into home.    Review of Systems   Respiratory: Negative for cough and shortness of breath.    Cardiovascular: Negative for chest pain and palpitations.   Gastrointestinal: Positive for constipation. Negative for abdominal pain.   Genitourinary: Positive for pelvic pain and vaginal pain.     Objective:     Vital Signs (Most Recent):  Temp: 98.7 °F (37.1 °C) (07/01/17 0800)  Pulse: 96 (07/01/17 0800)  Resp: 18 (07/01/17 0800)  BP: (!) 110/55 (07/01/17 0800)  SpO2: 95 % (07/01/17 0800) Vital Signs (24h Range):  Temp:  [97.6 °F (36.4 °C)-98.7 °F (37.1 °C)] 98.7 °F (37.1 °C)  Pulse:  [] 96  Resp:  [17-18] 18  SpO2:  [92 %-97 %] 95 %  BP: (108-133)/(53-85) 110/55     Weight: 106.1 kg (233 lb 14.5 oz)  Body mass index is 45.68 kg/m².    Intake/Output Summary (Last 24 hours) at 07/01/17 1057  Last data filed at 07/01/17 0537   Gross per 24 hour   Intake             1100 ml   Output              775 ml   Net              325 ml      Physical Exam   Constitutional: She is oriented to person, place, and time. She appears well-developed and well-nourished.   Pulmonary/Chest: Effort normal. She has no rhonchi. She has no rales.   Abdominal: Soft. Bowel sounds are normal. There is no tenderness.   Genitourinary:   Genitourinary Comments: Suprapubic catheter   Musculoskeletal: She exhibits edema.   Neurological: She is alert and oriented to person, place, and time.   Skin: Skin is warm and dry.   Psychiatric: She has a normal mood and affect. Her behavior is normal.   Nursing note and vitals reviewed.      Significant Labs:   CBC:     Recent Labs  Lab 06/30/17  0435 07/01/17  0429   WBC 18.39* 12.49   HGB 8.4* 8.6*   HCT 25.1* 26.7*    288     CMP:     Recent Labs  Lab 06/30/17  0435 07/01/17  0429   * 130*   K 4.5 4.1   CL 95 94*   CO2 25 27   * 123*   BUN 21 22   CREATININE 0.8 0.8   CALCIUM 8.3* 8.7   ANIONGAP 9 9   EGFRNONAA >60  >60     Magnesium:     Recent Labs  Lab 06/30/17  0435 07/01/17  0429   MG 1.7 1.9       Significant Imaging: I have reviewed all pertinent imaging results/findings within the past 24 hours.  None new

## 2017-07-01 NOTE — PLAN OF CARE
[facility]  Jones Rocha MD, [facility]     MD Ron Kothari FNP Renee Melancon, PA-C Rosanne Zeringue, NP  [address] [place] [id number]  Office: [phone]  Fax: [phone]                                     HOSPICE  ORDERS     Patient Name: La Summers  YOB: 1945    07/01/2017    Admit to Hospice:  Home Service      Diagnoses:  Active Hospital Problems    Diagnosis  POA    Acute diastolic heart failure [I50.31]  Yes     Priority: 2     Obstipation [K59.00]  Yes    Chronic neoplasm-related pain [G89.3]  Yes     Chronic    Aortic stenosis [I35.0]  Yes     Chronic    Mechanical obstruction of urinary tract  Yes     Chronic    Reactive airway disease [J45.909]  Yes    Mechanical obstruction of the intestine [K56.69]  Yes     Chronic    Acute cystitis without hematuria [N30.00]  Yes    Therapeutic opioid induced constipation [K59.03, T40.2X5A]  Yes     Chronic    Essential hypertension [I10]  Yes     Chronic    Acquired hypothyroidism [E03.9]  Yes     Chronic    Obstructive sleep apnea on CPAP [G47.33, Z99.89]  Not Applicable     Chronic    Vulvar cancer [C51.9]  Yes     Chronic      Resolved Hospital Problems    Diagnosis Date Resolved POA    *Sepsis secondary to UTI [A41.9, N39.0] 06/30/2017 Yes     Priority: 1 - High    Hypokalemia [E87.6] 06/26/2017 Yes    Acute kidney injury [N17.9] 06/27/2017 Yes    Hypomagnesemia [E83.42] 06/26/2017 Yes    Shortness of breath [R06.02] 06/29/2017 Yes       Hospice Qualifying Diagnoses: metastatic vulvar cancer       Patient has a life expectancy < 6 months due to these conditions.    Vital Signs: Routine per Hospice Protocol.    Allergies:  Review of patient's allergies indicates:   Allergen Reactions    Adhesive     Latex, natural rubber      Skin irritation       Discharge Procedure Orders  NEBULIZER FOR HOME USE   Order Specific Question Answer  Comments   Height: 5' (1.524 m)    Weight: 106.1 kg (233 lb 14.5 oz)    Does patient have medical equipment at home? cane, quad    Does patient have medical equipment at home? CPAP    Length of need (1-99 months): 99      Diet general   Order Specific Question Answer Comments   Na restriction, if any: 2gNa      Activity as tolerated     Call MD for:  severe uncontrolled pain       Nursing: Per Hospice Routine    Suprapubic catheter care    Future Orders:  Hospice Medical Director may dictate new orders for comfortable care measures & sign death certificate.    Medications:      La Summers   Home Medication Instructions JERE:[id number]    Printed on:07/01/17 9107   Medication Information                      albuterol (PROVENTIL) 2.5 mg /3 mL (0.083 %) nebulizer solution  Take 3 mLs (2.5 mg total) by nebulization every 4 (four) hours as needed for Wheezing (wheeze). Rescue             bisacodyl (DULCOLAX) 5 mg EC tablet  Take 1 tablet (5 mg total) by mouth daily as needed for Constipation.             docusate sodium (COLACE) 100 MG capsule  Take 1 capsule (100 mg total) by mouth 2 (two) times daily.             furosemide (LASIX) 40 MG tablet  Take 1 tablet (40 mg total) by mouth once daily.             hydrocodone-acetaminophen 10-325mg (NORCO)  mg Tab  Take 1 tablet by mouth every 4 (four) hours as needed.             ibuprofen (ADVIL,MOTRIN) 600 MG tablet  Take 1 tablet (600 mg total) by mouth every 6 (six) hours as needed for Pain.             ibuprofen (ADVIL,MOTRIN) 800 MG tablet  Take 800 mg by mouth every 8 (eight) hours as needed for Pain.             lactulose (CEPHULAC) 20 gram Pack  Take 1 packet (20 g total) by mouth 3 (three) times daily as needed (constipation).             levothyroxine (SYNTHROID) 50 MCG tablet  Take 50 mcg by mouth before breakfast.              lidocaine HCL 2% (XYLOCAINE) 2 % jelly  Apply to affected area daily prn             lubiprostone (AMITIZA) 8 MCG Cap  Take  1 capsule (8 mcg total) by mouth 2 (two) times daily with meals.             meclizine (ANTIVERT) 32 MG tablet  Take 32 mg by mouth as needed.             nystatin (MYCOSTATIN) cream  Apply topically 2 (two) times daily.             nystatin (MYCOSTATIN) powder  Apply 1 application topically daily as needed.             oxycodone (ROXICODONE) 15 MG Tab  Take 1 tablet (15 mg total) by mouth every 4 (four) hours as needed (severe pain).             polyethylene glycol (GLYCOLAX) 17 gram/dose powder  Take 17 g by mouth 2 (two) times daily.             triamcinolone acetonide 0.1% (KENALOG) 0.1 % cream                     _________________________________  Parviz Mccarthy MD  07/01/2017

## 2017-08-24 ENCOUNTER — TELEPHONE (OUTPATIENT)
Dept: GYNECOLOGIC ONCOLOGY | Facility: CLINIC | Age: 72
End: 2017-08-24

## 2017-08-24 NOTE — TELEPHONE ENCOUNTER
----- Message from Isabell Dao sent at 8/14/2017  8:56 AM CDT -----  La Spencer passed away 7/28/17   Her family called this morning to notify office     Clinic# [id number]      I called and offered my condolences.

## 2019-12-31 NOTE — ASSESSMENT & PLAN NOTE
Mechanical obstruction of urinary tract  Not currently treated.  Followed by Dr. Dougie Sanchez.  Appreciate Urology and IR.  Suprapubic catheter placed.       No

## 2020-10-29 NOTE — PLAN OF CARE
Problem: Patient Care Overview  Goal: Plan of Care Review  Sats 96% RA; tolerating well; aerosol completed without incident; will continue to monitor.        LR

## 2025-06-27 NOTE — ASSESSMENT & PLAN NOTE
-Antibiotics per primary team.  Urine culture reviewed and negative.  However, leukocytosis continues to increase.  -Agree with palliative diversion of urine likely with a suprapubic catheter.  If this is not feasible, as a last resort the patient couldn't have nephrostomy tubes placed.  However, urologic suprapubic catheter would likely require a large open incision with placement of the catheter under direct vision given a potential inability to catheterize transurethrally in order to distend the bladder or perform suprapubic tube placement under cystoscopic vision.  Therefore, we will consult interventional radiology for possible percutaneous suprapubic catheter placement under image guidance.   33